# Patient Record
Sex: MALE | Race: WHITE | Employment: FULL TIME | URBAN - METROPOLITAN AREA
[De-identification: names, ages, dates, MRNs, and addresses within clinical notes are randomized per-mention and may not be internally consistent; named-entity substitution may affect disease eponyms.]

---

## 2022-11-08 ENCOUNTER — APPOINTMENT (OUTPATIENT)
Dept: CT IMAGING | Age: 52
DRG: 320 | End: 2022-11-08
Payer: COMMERCIAL

## 2022-11-08 ENCOUNTER — APPOINTMENT (OUTPATIENT)
Dept: GENERAL RADIOLOGY | Age: 52
DRG: 320 | End: 2022-11-08
Payer: COMMERCIAL

## 2022-11-08 ENCOUNTER — HOSPITAL ENCOUNTER (INPATIENT)
Age: 52
LOS: 9 days | Discharge: NURSING, REHAB OR LONG-TERM CARE | DRG: 320 | End: 2022-11-17
Attending: EMERGENCY MEDICINE | Admitting: SURGERY
Payer: COMMERCIAL
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
## 2022-11-08 DIAGNOSIS — N50.1 PELVIC HEMATOMA, MALE: ICD-10-CM

## 2022-11-08 DIAGNOSIS — S32.402A CLOSED DISPLACED FRACTURE OF LEFT ACETABULUM, UNSPECIFIED PORTION OF ACETABULUM, INITIAL ENCOUNTER (HCC): ICD-10-CM

## 2022-11-08 DIAGNOSIS — S32.82XA MULTIPLE CLOSED FRACTURES OF PELVIS, INITIAL ENCOUNTER (HCC): ICD-10-CM

## 2022-11-08 LAB
ABSOLUTE EOS #: <0.03 K/UL (ref 0–0.44)
ABSOLUTE IMMATURE GRANULOCYTE: 0.1 K/UL (ref 0–0.3)
ABSOLUTE LYMPH #: 0.77 K/UL (ref 1.1–3.7)
ABSOLUTE MONO #: 1.39 K/UL (ref 0.1–1.2)
BASOPHILS # BLD: 0 % (ref 0–2)
BASOPHILS ABSOLUTE: 0.05 K/UL (ref 0–0.2)
EOSINOPHILS RELATIVE PERCENT: 0 % (ref 1–4)
HCT VFR BLD CALC: 41.8 % (ref 40.7–50.3)
HEMOGLOBIN: 13.8 G/DL (ref 13–17)
IMMATURE GRANULOCYTES: 1 %
INR BLD: 1
LYMPHOCYTES # BLD: 5 % (ref 24–43)
MCH RBC QN AUTO: 27.4 PG (ref 25.2–33.5)
MCHC RBC AUTO-ENTMCNC: 33 G/DL (ref 28.4–34.8)
MCV RBC AUTO: 83.1 FL (ref 82.6–102.9)
MONOCYTES # BLD: 9 % (ref 3–12)
NRBC AUTOMATED: 0 PER 100 WBC
PARTIAL THROMBOPLASTIN TIME: 21.9 SEC (ref 20.5–30.5)
PDW BLD-RTO: 13.9 % (ref 11.8–14.4)
PLATELET # BLD: 281 K/UL (ref 138–453)
PMV BLD AUTO: 10.2 FL (ref 8.1–13.5)
PRO-BNP: 915 PG/ML
PROTHROMBIN TIME: 10.9 SEC (ref 9.1–12.3)
RBC # BLD: 5.03 M/UL (ref 4.21–5.77)
SEG NEUTROPHILS: 85 % (ref 36–65)
SEGMENTED NEUTROPHILS ABSOLUTE COUNT: 13.83 K/UL (ref 1.5–8.1)
TROPONIN, HIGH SENSITIVITY: 22 NG/L (ref 0–22)
TROPONIN, HIGH SENSITIVITY: 36 NG/L (ref 0–22)
WBC # BLD: 16.1 K/UL (ref 3.5–11.3)

## 2022-11-08 PROCEDURE — 85025 COMPLETE CBC W/AUTO DIFF WBC: CPT

## 2022-11-08 PROCEDURE — 80048 BASIC METABOLIC PNL TOTAL CA: CPT

## 2022-11-08 PROCEDURE — 85730 THROMBOPLASTIN TIME PARTIAL: CPT

## 2022-11-08 PROCEDURE — 76376 3D RENDER W/INTRP POSTPROCES: CPT

## 2022-11-08 PROCEDURE — 85610 PROTHROMBIN TIME: CPT

## 2022-11-08 PROCEDURE — 84484 ASSAY OF TROPONIN QUANT: CPT

## 2022-11-08 PROCEDURE — 82306 VITAMIN D 25 HYDROXY: CPT

## 2022-11-08 PROCEDURE — 83880 ASSAY OF NATRIURETIC PEPTIDE: CPT

## 2022-11-08 PROCEDURE — 99285 EMERGENCY DEPT VISIT HI MDM: CPT

## 2022-11-08 PROCEDURE — 93005 ELECTROCARDIOGRAM TRACING: CPT | Performed by: STUDENT IN AN ORGANIZED HEALTH CARE EDUCATION/TRAINING PROGRAM

## 2022-11-08 PROCEDURE — 72190 X-RAY EXAM OF PELVIS: CPT

## 2022-11-08 PROCEDURE — 72192 CT PELVIS W/O DYE: CPT

## 2022-11-08 PROCEDURE — 2000000000 HC ICU R&B

## 2022-11-08 RX ORDER — LIDOCAINE HYDROCHLORIDE 10 MG/ML
20 INJECTION, SOLUTION INFILTRATION; PERINEURAL ONCE
Status: DISCONTINUED | OUTPATIENT
Start: 2022-11-08 | End: 2022-11-09

## 2022-11-08 ASSESSMENT — ENCOUNTER SYMPTOMS
NAUSEA: 0
ABDOMINAL PAIN: 0
VOMITING: 0
RHINORRHEA: 0
BACK PAIN: 1
CONSTIPATION: 0
SORE THROAT: 0
SHORTNESS OF BREATH: 0
CHEST TIGHTNESS: 0
DIARRHEA: 0
COUGH: 0

## 2022-11-08 ASSESSMENT — PAIN SCALES - GENERAL: PAINLEVEL_OUTOF10: 10

## 2022-11-08 ASSESSMENT — PAIN - FUNCTIONAL ASSESSMENT: PAIN_FUNCTIONAL_ASSESSMENT: 0-10

## 2022-11-09 ENCOUNTER — APPOINTMENT (OUTPATIENT)
Dept: GENERAL RADIOLOGY | Age: 52
DRG: 320 | End: 2022-11-09
Payer: COMMERCIAL

## 2022-11-09 ENCOUNTER — ANESTHESIA (OUTPATIENT)
Dept: OPERATING ROOM | Age: 52
DRG: 320 | End: 2022-11-09
Payer: COMMERCIAL

## 2022-11-09 ENCOUNTER — APPOINTMENT (OUTPATIENT)
Dept: CT IMAGING | Age: 52
DRG: 320 | End: 2022-11-09
Payer: COMMERCIAL

## 2022-11-09 ENCOUNTER — ANESTHESIA EVENT (OUTPATIENT)
Dept: OPERATING ROOM | Age: 52
DRG: 320 | End: 2022-11-09
Payer: COMMERCIAL

## 2022-11-09 PROBLEM — R73.9 HYPERGLYCEMIA: Status: ACTIVE | Noted: 2022-11-09

## 2022-11-09 PROBLEM — I10 HYPERTENSION: Status: ACTIVE | Noted: 2022-11-09

## 2022-11-09 PROBLEM — S32.402A ACETABULUM FRACTURE, LEFT (HCC): Status: ACTIVE | Noted: 2022-11-09

## 2022-11-09 PROBLEM — N50.1 PELVIC HEMATOMA IN MALE: Status: ACTIVE | Noted: 2022-11-09

## 2022-11-09 LAB
ABSOLUTE EOS #: 0 K/UL (ref 0–0.4)
ABSOLUTE EOS #: 0 K/UL (ref 0–0.4)
ABSOLUTE IMMATURE GRANULOCYTE: 0 K/UL (ref 0–0.3)
ABSOLUTE IMMATURE GRANULOCYTE: 0.23 K/UL (ref 0–0.3)
ABSOLUTE LYMPH #: 0.56 K/UL (ref 1–4.8)
ABSOLUTE LYMPH #: 0.9 K/UL (ref 1–4.8)
ABSOLUTE MONO #: 0.9 K/UL (ref 0.1–0.8)
ABSOLUTE MONO #: 1.55 K/UL (ref 0.1–0.8)
ALLEN TEST: ABNORMAL
ALLEN TEST: ABNORMAL
ANION GAP SERPL CALCULATED.3IONS-SCNC: 12 MMOL/L (ref 9–17)
ANION GAP SERPL CALCULATED.3IONS-SCNC: 9 MMOL/L (ref 9–17)
ANION GAP SERPL CALCULATED.3IONS-SCNC: 9 MMOL/L (ref 9–17)
ANION GAP SERPL CALCULATED.3IONS-SCNC: NORMAL MMOL/L (ref 9–17)
BASOPHILS # BLD: 0 % (ref 0–2)
BASOPHILS # BLD: 0 % (ref 0–2)
BASOPHILS ABSOLUTE: 0 K/UL (ref 0–0.2)
BASOPHILS ABSOLUTE: 0 K/UL (ref 0–0.2)
BUN BLDV-MCNC: 12 MG/DL (ref 6–20)
BUN BLDV-MCNC: 15 MG/DL (ref 6–20)
BUN BLDV-MCNC: 8 MG/DL (ref 6–20)
BUN BLDV-MCNC: NORMAL MG/DL (ref 6–20)
CALCIUM IONIZED: 1.1 MMOL/L (ref 1.13–1.33)
CALCIUM IONIZED: 1.13 MMOL/L (ref 1.13–1.33)
CALCIUM IONIZED: 1.24 MMOL/L (ref 1.13–1.33)
CALCIUM SERPL-MCNC: 4.6 MG/DL (ref 8.6–10.4)
CALCIUM SERPL-MCNC: 7.8 MG/DL (ref 8.6–10.4)
CALCIUM SERPL-MCNC: 8.7 MG/DL (ref 8.6–10.4)
CALCIUM SERPL-MCNC: NORMAL MG/DL (ref 8.6–10.4)
CARBOXYHEMOGLOBIN: 1.5 % (ref 0–5)
CARBOXYHEMOGLOBIN: 1.7 % (ref 0–5)
CHLORIDE BLD-SCNC: 101 MMOL/L (ref 98–107)
CHLORIDE BLD-SCNC: 108 MMOL/L (ref 98–107)
CHLORIDE BLD-SCNC: 120 MMOL/L (ref 98–107)
CHLORIDE BLD-SCNC: NORMAL MMOL/L (ref 98–107)
CHLORIDE, WHOLE BLOOD: 110 MMOL/L (ref 98–110)
CO2: 10 MMOL/L (ref 20–31)
CO2: 20 MMOL/L (ref 20–31)
CO2: 22 MMOL/L (ref 20–31)
CO2: NORMAL MMOL/L (ref 20–31)
CREAT SERPL-MCNC: 0.35 MG/DL (ref 0.7–1.2)
CREAT SERPL-MCNC: 0.87 MG/DL (ref 0.7–1.2)
CREAT SERPL-MCNC: 1.17 MG/DL (ref 0.7–1.2)
CREAT SERPL-MCNC: NORMAL MG/DL (ref 0.7–1.2)
EOSINOPHILS RELATIVE PERCENT: 0 % (ref 1–4)
EOSINOPHILS RELATIVE PERCENT: 0 % (ref 1–4)
ESTIMATED AVERAGE GLUCOSE: 200 MG/DL
FIO2: 50
FIO2: 50
GFR SERPL CREATININE-BSD FRML MDRD: >60 ML/MIN/1.73M2
GFR SERPL CREATININE-BSD FRML MDRD: NORMAL ML/MIN/1.73M2
GLUCOSE BLD-MCNC: 136 MG/DL (ref 75–110)
GLUCOSE BLD-MCNC: 159 MG/DL (ref 75–110)
GLUCOSE BLD-MCNC: 169 MG/DL (ref 75–110)
GLUCOSE BLD-MCNC: 176 MG/DL (ref 75–110)
GLUCOSE BLD-MCNC: 188 MG/DL (ref 75–110)
GLUCOSE BLD-MCNC: 194 MG/DL (ref 70–99)
GLUCOSE BLD-MCNC: 196 MG/DL (ref 75–110)
GLUCOSE BLD-MCNC: 197 MG/DL (ref 75–110)
GLUCOSE BLD-MCNC: 204 MG/DL (ref 75–110)
GLUCOSE BLD-MCNC: 206 MG/DL (ref 75–110)
GLUCOSE BLD-MCNC: 214 MG/DL (ref 70–99)
GLUCOSE BLD-MCNC: 256 MG/DL (ref 75–110)
GLUCOSE BLD-MCNC: 302 MG/DL (ref 75–110)
GLUCOSE BLD-MCNC: 320 MG/DL (ref 70–99)
GLUCOSE BLD-MCNC: NORMAL MG/DL (ref 70–99)
HBA1C MFR BLD: 8.6 % (ref 4–6)
HCO3 ARTERIAL: 20.7 MMOL/L (ref 22–27)
HCO3 ARTERIAL: 22.6 MMOL/L (ref 22–27)
HCT VFR BLD CALC: 37.9 % (ref 40.7–50.3)
HCT VFR BLD CALC: 41.5 % (ref 40.7–50.3)
HCT VFR BLD CALC: 43.5 % (ref 40.7–50.3)
HEMOGLOBIN: 12.3 GM/DL (ref 13–17)
HEMOGLOBIN: 13 G/DL (ref 13–17)
HEMOGLOBIN: 14 G/DL (ref 13–17)
IMMATURE GRANULOCYTES: 0 %
IMMATURE GRANULOCYTES: 1 %
LACTIC ACID, WHOLE BLOOD: 1.6 MMOL/L (ref 0.7–2.1)
LV EF: 38 %
LVEF MODALITY: NORMAL
LYMPHOCYTES # BLD: 4 % (ref 24–44)
LYMPHOCYTES # BLD: 4 % (ref 24–44)
MAGNESIUM: 2 MG/DL (ref 1.6–2.6)
MCH RBC QN AUTO: 27.1 PG (ref 25.2–33.5)
MCH RBC QN AUTO: 27.7 PG (ref 25.2–33.5)
MCHC RBC AUTO-ENTMCNC: 31.3 G/DL (ref 28.4–34.8)
MCHC RBC AUTO-ENTMCNC: 32.2 G/DL (ref 28.4–34.8)
MCV RBC AUTO: 84.3 FL (ref 82.6–102.9)
MCV RBC AUTO: 88.3 FL (ref 82.6–102.9)
MONOCYTES # BLD: 11 % (ref 1–7)
MONOCYTES # BLD: 4 % (ref 1–7)
MORPHOLOGY: NORMAL
MORPHOLOGY: NORMAL
NEGATIVE BASE EXCESS, ART: 2 MMOL/L (ref 0–2)
NEGATIVE BASE EXCESS, ART: 3.3 MMOL/L (ref 0–2)
NRBC AUTOMATED: 0 PER 100 WBC
NRBC AUTOMATED: 0 PER 100 WBC
O2 SAT, ARTERIAL: 98.9 % (ref 94–100)
O2 SAT, ARTERIAL: 99 % (ref 94–100)
PATIENT TEMP: 37
PATIENT TEMP: 37
PCO2 ARTERIAL: 35.5 MMHG (ref 32–45)
PCO2 ARTERIAL: 40.6 MMHG (ref 32–45)
PDW BLD-RTO: 13.5 % (ref 11.8–14.4)
PDW BLD-RTO: 13.9 % (ref 11.8–14.4)
PH ARTERIAL: 7.37 (ref 7.35–7.45)
PH ARTERIAL: 7.38 (ref 7.35–7.45)
PHOSPHORUS: 3.7 MG/DL (ref 2.5–4.5)
PLATELET # BLD: 235 K/UL (ref 138–453)
PLATELET # BLD: 334 K/UL (ref 138–453)
PMV BLD AUTO: 10 FL (ref 8.1–13.5)
PMV BLD AUTO: 10 FL (ref 8.1–13.5)
PO2 ARTERIAL: 135 MMHG (ref 75–95)
PO2 ARTERIAL: 143 MMHG (ref 75–95)
POTASSIUM SERPL-SCNC: 3 MMOL/L (ref 3.7–5.3)
POTASSIUM SERPL-SCNC: 4.2 MMOL/L (ref 3.7–5.3)
POTASSIUM SERPL-SCNC: 4.4 MMOL/L (ref 3.7–5.3)
POTASSIUM SERPL-SCNC: NORMAL MMOL/L (ref 3.7–5.3)
POTASSIUM, WHOLE BLOOD: 4.3 MMOL/L (ref 3.6–5)
RBC # BLD: 4.7 M/UL (ref 4.21–5.77)
RBC # BLD: 5.16 M/UL (ref 4.21–5.77)
SEG NEUTROPHILS: 85 % (ref 36–66)
SEG NEUTROPHILS: 91 % (ref 36–66)
SEGMENTED NEUTROPHILS ABSOLUTE COUNT: 11.99 K/UL (ref 1.8–7.7)
SEGMENTED NEUTROPHILS ABSOLUTE COUNT: 20.47 K/UL (ref 1.8–7.7)
SODIUM BLD-SCNC: 132 MMOL/L (ref 135–144)
SODIUM BLD-SCNC: 137 MMOL/L (ref 135–144)
SODIUM BLD-SCNC: 142 MMOL/L (ref 135–144)
SODIUM BLD-SCNC: NORMAL MMOL/L (ref 135–144)
SODIUM, WHOLE BLOOD: 135 MMOL/L (ref 136–145)
TROPONIN, HIGH SENSITIVITY: 42 NG/L (ref 0–22)
VITAMIN D 25-HYDROXY: 9.3 NG/ML
WBC # BLD: 14.1 K/UL (ref 3.5–11.3)
WBC # BLD: 22.5 K/UL (ref 3.5–11.3)

## 2022-11-09 PROCEDURE — 72192 CT PELVIS W/O DYE: CPT

## 2022-11-09 PROCEDURE — 6360000002 HC RX W HCPCS

## 2022-11-09 PROCEDURE — 72190 X-RAY EXAM OF PELVIS: CPT

## 2022-11-09 PROCEDURE — 82805 BLOOD GASES W/O2 SATURATION: CPT

## 2022-11-09 PROCEDURE — 3600000004 HC SURGERY LEVEL 4 BASE: Performed by: ORTHOPAEDIC SURGERY

## 2022-11-09 PROCEDURE — 85025 COMPLETE CBC W/AUTO DIFF WBC: CPT

## 2022-11-09 PROCEDURE — C1713 ANCHOR/SCREW BN/BN,TIS/BN: HCPCS | Performed by: ORTHOPAEDIC SURGERY

## 2022-11-09 PROCEDURE — 6360000002 HC RX W HCPCS: Performed by: NURSE ANESTHETIST, CERTIFIED REGISTERED

## 2022-11-09 PROCEDURE — 86850 RBC ANTIBODY SCREEN: CPT

## 2022-11-09 PROCEDURE — 92523 SPEECH SOUND LANG COMPREHEN: CPT

## 2022-11-09 PROCEDURE — 6370000000 HC RX 637 (ALT 250 FOR IP)

## 2022-11-09 PROCEDURE — 3209999900 FLUORO FOR SURGICAL PROCEDURES

## 2022-11-09 PROCEDURE — 3600000014 HC SURGERY LEVEL 4 ADDTL 15MIN: Performed by: ORTHOPAEDIC SURGERY

## 2022-11-09 PROCEDURE — 6370000000 HC RX 637 (ALT 250 FOR IP): Performed by: STUDENT IN AN ORGANIZED HEALTH CARE EDUCATION/TRAINING PROGRAM

## 2022-11-09 PROCEDURE — 2580000003 HC RX 258

## 2022-11-09 PROCEDURE — 3700000001 HC ADD 15 MINUTES (ANESTHESIA): Performed by: ORTHOPAEDIC SURGERY

## 2022-11-09 PROCEDURE — 76376 3D RENDER W/INTRP POSTPROCES: CPT

## 2022-11-09 PROCEDURE — 82962 GLUCOSE BLOOD TEST: CPT

## 2022-11-09 PROCEDURE — 71045 X-RAY EXAM CHEST 1 VIEW: CPT

## 2022-11-09 PROCEDURE — 93005 ELECTROCARDIOGRAM TRACING: CPT | Performed by: STUDENT IN AN ORGANIZED HEALTH CARE EDUCATION/TRAINING PROGRAM

## 2022-11-09 PROCEDURE — 72170 X-RAY EXAM OF PELVIS: CPT

## 2022-11-09 PROCEDURE — 6360000002 HC RX W HCPCS: Performed by: STUDENT IN AN ORGANIZED HEALTH CARE EDUCATION/TRAINING PROGRAM

## 2022-11-09 PROCEDURE — 7100000001 HC PACU RECOVERY - ADDTL 15 MIN: Performed by: ORTHOPAEDIC SURGERY

## 2022-11-09 PROCEDURE — 82947 ASSAY GLUCOSE BLOOD QUANT: CPT

## 2022-11-09 PROCEDURE — 93308 TTE F-UP OR LMTD: CPT

## 2022-11-09 PROCEDURE — 36415 COLL VENOUS BLD VENIPUNCTURE: CPT

## 2022-11-09 PROCEDURE — 73560 X-RAY EXAM OF KNEE 1 OR 2: CPT

## 2022-11-09 PROCEDURE — 82330 ASSAY OF CALCIUM: CPT

## 2022-11-09 PROCEDURE — 85014 HEMATOCRIT: CPT

## 2022-11-09 PROCEDURE — 84484 ASSAY OF TROPONIN QUANT: CPT

## 2022-11-09 PROCEDURE — 84132 ASSAY OF SERUM POTASSIUM: CPT

## 2022-11-09 PROCEDURE — 2500000003 HC RX 250 WO HCPCS: Performed by: STUDENT IN AN ORGANIZED HEALTH CARE EDUCATION/TRAINING PROGRAM

## 2022-11-09 PROCEDURE — 37799 UNLISTED PX VASCULAR SURGERY: CPT

## 2022-11-09 PROCEDURE — 0QS504Z REPOSITION LEFT ACETABULUM WITH INTERNAL FIXATION DEVICE, OPEN APPROACH: ICD-10-PCS | Performed by: ORTHOPAEDIC SURGERY

## 2022-11-09 PROCEDURE — 0QS304Z REPOSITION LEFT PELVIC BONE WITH INTERNAL FIXATION DEVICE, OPEN APPROACH: ICD-10-PCS | Performed by: ORTHOPAEDIC SURGERY

## 2022-11-09 PROCEDURE — 83735 ASSAY OF MAGNESIUM: CPT

## 2022-11-09 PROCEDURE — 73630 X-RAY EXAM OF FOOT: CPT

## 2022-11-09 PROCEDURE — 2500000003 HC RX 250 WO HCPCS

## 2022-11-09 PROCEDURE — 83605 ASSAY OF LACTIC ACID: CPT

## 2022-11-09 PROCEDURE — 2580000003 HC RX 258: Performed by: STUDENT IN AN ORGANIZED HEALTH CARE EDUCATION/TRAINING PROGRAM

## 2022-11-09 PROCEDURE — 86920 COMPATIBILITY TEST SPIN: CPT

## 2022-11-09 PROCEDURE — 83036 HEMOGLOBIN GLYCOSYLATED A1C: CPT

## 2022-11-09 PROCEDURE — 2720000010 HC SURG SUPPLY STERILE: Performed by: ORTHOPAEDIC SURGERY

## 2022-11-09 PROCEDURE — 2580000003 HC RX 258: Performed by: NURSE ANESTHETIST, CERTIFIED REGISTERED

## 2022-11-09 PROCEDURE — 86900 BLOOD TYPING SEROLOGIC ABO: CPT

## 2022-11-09 PROCEDURE — 85018 HEMOGLOBIN: CPT

## 2022-11-09 PROCEDURE — 2000000000 HC ICU R&B

## 2022-11-09 PROCEDURE — 86901 BLOOD TYPING SEROLOGIC RH(D): CPT

## 2022-11-09 PROCEDURE — 2709999900 HC NON-CHARGEABLE SUPPLY: Performed by: ORTHOPAEDIC SURGERY

## 2022-11-09 PROCEDURE — 80048 BASIC METABOLIC PNL TOTAL CA: CPT

## 2022-11-09 PROCEDURE — 84100 ASSAY OF PHOSPHORUS: CPT

## 2022-11-09 PROCEDURE — 6360000002 HC RX W HCPCS: Performed by: ORTHOPAEDIC SURGERY

## 2022-11-09 PROCEDURE — 7100000000 HC PACU RECOVERY - FIRST 15 MIN: Performed by: ORTHOPAEDIC SURGERY

## 2022-11-09 PROCEDURE — 3700000000 HC ANESTHESIA ATTENDED CARE: Performed by: ORTHOPAEDIC SURGERY

## 2022-11-09 PROCEDURE — 2500000003 HC RX 250 WO HCPCS: Performed by: NURSE ANESTHETIST, CERTIFIED REGISTERED

## 2022-11-09 PROCEDURE — 2580000003 HC RX 258: Performed by: ORTHOPAEDIC SURGERY

## 2022-11-09 DEVICE — CORTEX SCREW S.T.: Type: IMPLANTABLE DEVICE | Site: ACETABULUM | Status: FUNCTIONAL

## 2022-11-09 DEVICE — SUPRAPECTINEAL PLATE, LEFT
Type: IMPLANTABLE DEVICE | Status: FUNCTIONAL
Brand: PRO

## 2022-11-09 DEVICE — K-WIRE
Type: IMPLANTABLE DEVICE | Status: FUNCTIONAL
Brand: PRO

## 2022-11-09 RX ORDER — ERGOCALCIFEROL 1.25 MG/1
50000 CAPSULE ORAL WEEKLY
Qty: 8 CAPSULE | Refills: 1 | Status: SHIPPED | OUTPATIENT
Start: 2022-11-09

## 2022-11-09 RX ORDER — LABETALOL HYDROCHLORIDE 5 MG/ML
10 INJECTION, SOLUTION INTRAVENOUS EVERY 6 HOURS PRN
Status: DISCONTINUED | OUTPATIENT
Start: 2022-11-09 | End: 2022-11-17 | Stop reason: HOSPADM

## 2022-11-09 RX ORDER — FENTANYL CITRATE 50 UG/ML
INJECTION, SOLUTION INTRAMUSCULAR; INTRAVENOUS PRN
Status: DISCONTINUED | OUTPATIENT
Start: 2022-11-09 | End: 2022-11-09 | Stop reason: SDUPTHER

## 2022-11-09 RX ORDER — POLYETHYLENE GLYCOL 3350 17 G/17G
17 POWDER, FOR SOLUTION ORAL DAILY
Status: DISCONTINUED | OUTPATIENT
Start: 2022-11-09 | End: 2022-11-17 | Stop reason: HOSPADM

## 2022-11-09 RX ORDER — SODIUM CHLORIDE 9 MG/ML
INJECTION, SOLUTION INTRAVENOUS CONTINUOUS PRN
Status: DISCONTINUED | OUTPATIENT
Start: 2022-11-09 | End: 2022-11-09 | Stop reason: SDUPTHER

## 2022-11-09 RX ORDER — CEFAZOLIN SODIUM 1 G/3ML
INJECTION, POWDER, FOR SOLUTION INTRAMUSCULAR; INTRAVENOUS PRN
Status: DISCONTINUED | OUTPATIENT
Start: 2022-11-09 | End: 2022-11-09 | Stop reason: SDUPTHER

## 2022-11-09 RX ORDER — MAGNESIUM HYDROXIDE 1200 MG/15ML
LIQUID ORAL CONTINUOUS PRN
Status: DISCONTINUED | OUTPATIENT
Start: 2022-11-09 | End: 2022-11-09 | Stop reason: HOSPADM

## 2022-11-09 RX ORDER — PROPOFOL 10 MG/ML
INJECTION, EMULSION INTRAVENOUS PRN
Status: DISCONTINUED | OUTPATIENT
Start: 2022-11-09 | End: 2022-11-09 | Stop reason: SDUPTHER

## 2022-11-09 RX ORDER — AMLODIPINE BESYLATE 10 MG/1
10 TABLET ORAL DAILY
Status: DISCONTINUED | OUTPATIENT
Start: 2022-11-09 | End: 2022-11-17 | Stop reason: HOSPADM

## 2022-11-09 RX ORDER — KETAMINE HCL IN NACL, ISO-OSM 100MG/10ML
SYRINGE (ML) INJECTION PRN
Status: DISCONTINUED | OUTPATIENT
Start: 2022-11-09 | End: 2022-11-09 | Stop reason: SDUPTHER

## 2022-11-09 RX ORDER — NEOSTIGMINE METHYLSULFATE 5 MG/5 ML
SYRINGE (ML) INTRAVENOUS PRN
Status: DISCONTINUED | OUTPATIENT
Start: 2022-11-09 | End: 2022-11-09 | Stop reason: SDUPTHER

## 2022-11-09 RX ORDER — MIDAZOLAM HYDROCHLORIDE 1 MG/ML
INJECTION INTRAMUSCULAR; INTRAVENOUS PRN
Status: DISCONTINUED | OUTPATIENT
Start: 2022-11-09 | End: 2022-11-09 | Stop reason: SDUPTHER

## 2022-11-09 RX ORDER — CARVEDILOL 25 MG/1
25 TABLET ORAL 2 TIMES DAILY WITH MEALS
Status: DISCONTINUED | OUTPATIENT
Start: 2022-11-09 | End: 2022-11-13

## 2022-11-09 RX ORDER — TRANEXAMIC ACID 10 MG/ML
INJECTION, SOLUTION INTRAVENOUS PRN
Status: DISCONTINUED | OUTPATIENT
Start: 2022-11-09 | End: 2022-11-09 | Stop reason: SDUPTHER

## 2022-11-09 RX ORDER — ATORVASTATIN CALCIUM 40 MG/1
40 TABLET, FILM COATED ORAL DAILY
COMMUNITY

## 2022-11-09 RX ORDER — MAGNESIUM SULFATE HEPTAHYDRATE 40 MG/ML
INJECTION, SOLUTION INTRAVENOUS PRN
Status: DISCONTINUED | OUTPATIENT
Start: 2022-11-09 | End: 2022-11-09 | Stop reason: SDUPTHER

## 2022-11-09 RX ORDER — ENOXAPARIN SODIUM 100 MG/ML
30 INJECTION SUBCUTANEOUS 2 TIMES DAILY
Status: DISCONTINUED | OUTPATIENT
Start: 2022-11-09 | End: 2022-11-17 | Stop reason: HOSPADM

## 2022-11-09 RX ORDER — SODIUM CHLORIDE 0.9 % (FLUSH) 0.9 %
5-40 SYRINGE (ML) INJECTION EVERY 12 HOURS SCHEDULED
Status: DISCONTINUED | OUTPATIENT
Start: 2022-11-09 | End: 2022-11-09 | Stop reason: HOSPADM

## 2022-11-09 RX ORDER — SENNA PLUS 8.6 MG/1
1 TABLET ORAL DAILY PRN
Status: DISCONTINUED | OUTPATIENT
Start: 2022-11-09 | End: 2022-11-17 | Stop reason: HOSPADM

## 2022-11-09 RX ORDER — HYDRALAZINE HYDROCHLORIDE 20 MG/ML
10 INJECTION INTRAMUSCULAR; INTRAVENOUS
Status: DISCONTINUED | OUTPATIENT
Start: 2022-11-09 | End: 2022-11-09 | Stop reason: HOSPADM

## 2022-11-09 RX ORDER — FUROSEMIDE 20 MG/1
20 TABLET ORAL DAILY
COMMUNITY

## 2022-11-09 RX ORDER — ROCURONIUM BROMIDE 10 MG/ML
INJECTION, SOLUTION INTRAVENOUS PRN
Status: DISCONTINUED | OUTPATIENT
Start: 2022-11-09 | End: 2022-11-09 | Stop reason: SDUPTHER

## 2022-11-09 RX ORDER — ONDANSETRON 4 MG/1
4 TABLET, ORALLY DISINTEGRATING ORAL EVERY 8 HOURS PRN
Status: DISCONTINUED | OUTPATIENT
Start: 2022-11-09 | End: 2022-11-17 | Stop reason: HOSPADM

## 2022-11-09 RX ORDER — DEXTROSE AND SODIUM CHLORIDE 5; .45 G/100ML; G/100ML
INJECTION, SOLUTION INTRAVENOUS CONTINUOUS
Status: DISCONTINUED | OUTPATIENT
Start: 2022-11-09 | End: 2022-11-10

## 2022-11-09 RX ORDER — PHENYLEPHRINE HYDROCHLORIDE 10 MG/ML
INJECTION INTRAVENOUS PRN
Status: DISCONTINUED | OUTPATIENT
Start: 2022-11-09 | End: 2022-11-09 | Stop reason: SDUPTHER

## 2022-11-09 RX ORDER — SODIUM CHLORIDE 0.9 % (FLUSH) 0.9 %
10 SYRINGE (ML) INJECTION PRN
Status: DISCONTINUED | OUTPATIENT
Start: 2022-11-09 | End: 2022-11-17 | Stop reason: HOSPADM

## 2022-11-09 RX ORDER — AMLODIPINE BESYLATE 10 MG/1
10 TABLET ORAL DAILY
COMMUNITY

## 2022-11-09 RX ORDER — METHOCARBAMOL 750 MG/1
750 TABLET, FILM COATED ORAL 4 TIMES DAILY
Status: DISCONTINUED | OUTPATIENT
Start: 2022-11-09 | End: 2022-11-10

## 2022-11-09 RX ORDER — SODIUM CHLORIDE 9 MG/ML
INJECTION, SOLUTION INTRAVENOUS PRN
Status: DISCONTINUED | OUTPATIENT
Start: 2022-11-09 | End: 2022-11-09 | Stop reason: HOSPADM

## 2022-11-09 RX ORDER — POTASSIUM CHLORIDE 7.45 MG/ML
10 INJECTION INTRAVENOUS
Status: COMPLETED | OUTPATIENT
Start: 2022-11-09 | End: 2022-11-09

## 2022-11-09 RX ORDER — ONDANSETRON 2 MG/ML
4 INJECTION INTRAMUSCULAR; INTRAVENOUS EVERY 6 HOURS PRN
Status: DISCONTINUED | OUTPATIENT
Start: 2022-11-09 | End: 2022-11-17 | Stop reason: HOSPADM

## 2022-11-09 RX ORDER — DEXTROSE MONOHYDRATE 100 MG/ML
INJECTION, SOLUTION INTRAVENOUS CONTINUOUS PRN
Status: DISCONTINUED | OUTPATIENT
Start: 2022-11-09 | End: 2022-11-17 | Stop reason: HOSPADM

## 2022-11-09 RX ORDER — FENTANYL CITRATE 50 UG/ML
50 INJECTION, SOLUTION INTRAMUSCULAR; INTRAVENOUS
Status: DISCONTINUED | OUTPATIENT
Start: 2022-11-09 | End: 2022-11-10

## 2022-11-09 RX ORDER — SODIUM CHLORIDE 0.9 % (FLUSH) 0.9 %
5-40 SYRINGE (ML) INJECTION PRN
Status: DISCONTINUED | OUTPATIENT
Start: 2022-11-09 | End: 2022-11-09 | Stop reason: HOSPADM

## 2022-11-09 RX ORDER — ONDANSETRON 2 MG/ML
4 INJECTION INTRAMUSCULAR; INTRAVENOUS
Status: DISCONTINUED | OUTPATIENT
Start: 2022-11-09 | End: 2022-11-09 | Stop reason: HOSPADM

## 2022-11-09 RX ORDER — LABETALOL HYDROCHLORIDE 5 MG/ML
10 INJECTION, SOLUTION INTRAVENOUS
Status: DISCONTINUED | OUTPATIENT
Start: 2022-11-09 | End: 2022-11-09 | Stop reason: HOSPADM

## 2022-11-09 RX ORDER — FAMOTIDINE 20 MG/1
20 TABLET, FILM COATED ORAL 2 TIMES DAILY
Status: DISCONTINUED | OUTPATIENT
Start: 2022-11-09 | End: 2022-11-17 | Stop reason: HOSPADM

## 2022-11-09 RX ORDER — ACETAMINOPHEN 500 MG
1000 TABLET ORAL EVERY 8 HOURS SCHEDULED
Status: DISCONTINUED | OUTPATIENT
Start: 2022-11-09 | End: 2022-11-17 | Stop reason: HOSPADM

## 2022-11-09 RX ORDER — ATORVASTATIN CALCIUM 40 MG/1
40 TABLET, FILM COATED ORAL DAILY
Status: DISCONTINUED | OUTPATIENT
Start: 2022-11-09 | End: 2022-11-17 | Stop reason: HOSPADM

## 2022-11-09 RX ORDER — LISINOPRIL 20 MG/1
20 TABLET ORAL DAILY
COMMUNITY

## 2022-11-09 RX ORDER — HYDRALAZINE HYDROCHLORIDE 20 MG/ML
10 INJECTION INTRAMUSCULAR; INTRAVENOUS EVERY 6 HOURS PRN
Status: DISCONTINUED | OUTPATIENT
Start: 2022-11-09 | End: 2022-11-17 | Stop reason: HOSPADM

## 2022-11-09 RX ORDER — CARVEDILOL 25 MG/1
25 TABLET ORAL 2 TIMES DAILY WITH MEALS
COMMUNITY

## 2022-11-09 RX ORDER — SODIUM CHLORIDE 9 MG/ML
INJECTION, SOLUTION INTRAVENOUS PRN
Status: DISCONTINUED | OUTPATIENT
Start: 2022-11-09 | End: 2022-11-17 | Stop reason: HOSPADM

## 2022-11-09 RX ORDER — VANCOMYCIN HYDROCHLORIDE 1 G/20ML
INJECTION, POWDER, LYOPHILIZED, FOR SOLUTION INTRAVENOUS PRN
Status: DISCONTINUED | OUTPATIENT
Start: 2022-11-09 | End: 2022-11-09 | Stop reason: HOSPADM

## 2022-11-09 RX ORDER — CARVEDILOL 25 MG/1
25 TABLET ORAL 2 TIMES DAILY WITH MEALS
Status: DISCONTINUED | OUTPATIENT
Start: 2022-11-09 | End: 2022-11-09

## 2022-11-09 RX ORDER — LIDOCAINE HYDROCHLORIDE 10 MG/ML
INJECTION, SOLUTION EPIDURAL; INFILTRATION; INTRACAUDAL; PERINEURAL PRN
Status: DISCONTINUED | OUTPATIENT
Start: 2022-11-09 | End: 2022-11-09 | Stop reason: SDUPTHER

## 2022-11-09 RX ORDER — SPIRONOLACTONE 25 MG/1
25 TABLET ORAL DAILY
COMMUNITY

## 2022-11-09 RX ORDER — OXYCODONE HYDROCHLORIDE 5 MG/1
5 TABLET ORAL EVERY 4 HOURS PRN
Status: DISCONTINUED | OUTPATIENT
Start: 2022-11-09 | End: 2022-11-17

## 2022-11-09 RX ORDER — CALCIUM CHLORIDE 100 MG/ML
INJECTION INTRAVENOUS; INTRAVENTRICULAR PRN
Status: DISCONTINUED | OUTPATIENT
Start: 2022-11-09 | End: 2022-11-09 | Stop reason: SDUPTHER

## 2022-11-09 RX ORDER — EPHEDRINE SULFATE/0.9% NACL/PF 50 MG/5 ML
SYRINGE (ML) INTRAVENOUS PRN
Status: DISCONTINUED | OUTPATIENT
Start: 2022-11-09 | End: 2022-11-09 | Stop reason: SDUPTHER

## 2022-11-09 RX ORDER — GLYCOPYRROLATE 1 MG/5 ML
SYRINGE (ML) INTRAVENOUS PRN
Status: DISCONTINUED | OUTPATIENT
Start: 2022-11-09 | End: 2022-11-09 | Stop reason: SDUPTHER

## 2022-11-09 RX ORDER — SODIUM CHLORIDE 0.9 % (FLUSH) 0.9 %
10 SYRINGE (ML) INJECTION EVERY 12 HOURS SCHEDULED
Status: DISCONTINUED | OUTPATIENT
Start: 2022-11-09 | End: 2022-11-17 | Stop reason: HOSPADM

## 2022-11-09 RX ORDER — DEXAMETHASONE SODIUM PHOSPHATE 10 MG/ML
INJECTION INTRAMUSCULAR; INTRAVENOUS PRN
Status: DISCONTINUED | OUTPATIENT
Start: 2022-11-09 | End: 2022-11-09 | Stop reason: SDUPTHER

## 2022-11-09 RX ADMIN — Medication 10 MG: at 03:20

## 2022-11-09 RX ADMIN — Medication 25 MG: at 14:11

## 2022-11-09 RX ADMIN — FENTANYL CITRATE 50 MCG: 0.05 INJECTION, SOLUTION INTRAMUSCULAR; INTRAVENOUS at 18:52

## 2022-11-09 RX ADMIN — OXYCODONE 5 MG: 5 TABLET ORAL at 11:31

## 2022-11-09 RX ADMIN — METHOCARBAMOL TABLETS 750 MG: 750 TABLET, COATED ORAL at 08:49

## 2022-11-09 RX ADMIN — PHENYLEPHRINE HYDROCHLORIDE 100 MCG: 10 INJECTION INTRAVENOUS at 16:10

## 2022-11-09 RX ADMIN — Medication 25 MG: at 15:45

## 2022-11-09 RX ADMIN — DEXAMETHASONE SODIUM PHOSPHATE 10 MG: 10 INJECTION INTRAMUSCULAR; INTRAVENOUS at 13:37

## 2022-11-09 RX ADMIN — FENTANYL CITRATE 50 MCG: 0.05 INJECTION, SOLUTION INTRAMUSCULAR; INTRAVENOUS at 19:07

## 2022-11-09 RX ADMIN — MAGNESIUM SULFATE HEPTAHYDRATE 2000 MG: 40 INJECTION, SOLUTION INTRAVENOUS at 14:31

## 2022-11-09 RX ADMIN — PROPOFOL 150 MG: 10 INJECTION, EMULSION INTRAVENOUS at 13:30

## 2022-11-09 RX ADMIN — SODIUM CHLORIDE, PRESERVATIVE FREE 10 ML: 5 INJECTION INTRAVENOUS at 08:47

## 2022-11-09 RX ADMIN — CALCIUM CHLORIDE 0.5 G: 100 INJECTION, SOLUTION INTRAVENOUS; INTRAVENTRICULAR at 16:36

## 2022-11-09 RX ADMIN — ROCURONIUM BROMIDE 20 MG: 10 INJECTION, SOLUTION INTRAVENOUS at 14:20

## 2022-11-09 RX ADMIN — ROCURONIUM BROMIDE 10 MG: 10 INJECTION, SOLUTION INTRAVENOUS at 16:04

## 2022-11-09 RX ADMIN — CEFAZOLIN 2 G: 1 INJECTION, POWDER, FOR SOLUTION INTRAMUSCULAR; INTRAVENOUS at 17:39

## 2022-11-09 RX ADMIN — Medication 4 MG: at 19:05

## 2022-11-09 RX ADMIN — POTASSIUM CHLORIDE 10 MEQ: 7.46 INJECTION, SOLUTION INTRAVENOUS at 04:03

## 2022-11-09 RX ADMIN — DEXTROSE AND SODIUM CHLORIDE: 5; 450 INJECTION, SOLUTION INTRAVENOUS at 09:43

## 2022-11-09 RX ADMIN — PHENYLEPHRINE HYDROCHLORIDE 100 MCG: 10 INJECTION INTRAVENOUS at 14:55

## 2022-11-09 RX ADMIN — SODIUM CHLORIDE: 9 INJECTION, SOLUTION INTRAVENOUS at 15:54

## 2022-11-09 RX ADMIN — TRANEXAMIC ACID 1 G: 10 INJECTION, SOLUTION INTRAVENOUS at 19:08

## 2022-11-09 RX ADMIN — PHENYLEPHRINE HYDROCHLORIDE 100 MCG: 10 INJECTION INTRAVENOUS at 14:53

## 2022-11-09 RX ADMIN — Medication 2000 MG: at 13:39

## 2022-11-09 RX ADMIN — DEXTROSE AND SODIUM CHLORIDE: 5; 450 INJECTION, SOLUTION INTRAVENOUS at 01:36

## 2022-11-09 RX ADMIN — SODIUM CHLORIDE 7.26 UNITS/HR: 9 INJECTION, SOLUTION INTRAVENOUS at 08:44

## 2022-11-09 RX ADMIN — Medication 0.8 MG: at 19:05

## 2022-11-09 RX ADMIN — LIDOCAINE HYDROCHLORIDE 50 MG: 10 INJECTION, SOLUTION EPIDURAL; INFILTRATION; INTRACAUDAL; PERINEURAL at 13:30

## 2022-11-09 RX ADMIN — PHENYLEPHRINE HYDROCHLORIDE 200 MCG: 10 INJECTION INTRAVENOUS at 15:54

## 2022-11-09 RX ADMIN — ROCURONIUM BROMIDE 10 MG: 10 INJECTION, SOLUTION INTRAVENOUS at 16:40

## 2022-11-09 RX ADMIN — POLYETHYLENE GLYCOL 3350 17 G: 17 POWDER, FOR SOLUTION ORAL at 09:03

## 2022-11-09 RX ADMIN — Medication 10 MG: at 16:18

## 2022-11-09 RX ADMIN — MIDAZOLAM 2 MG: 1 INJECTION INTRAMUSCULAR; INTRAVENOUS at 15:06

## 2022-11-09 RX ADMIN — HYDRALAZINE HYDROCHLORIDE 10 MG: 20 INJECTION INTRAMUSCULAR; INTRAVENOUS at 21:27

## 2022-11-09 RX ADMIN — ENOXAPARIN SODIUM 30 MG: 100 INJECTION SUBCUTANEOUS at 08:48

## 2022-11-09 RX ADMIN — SODIUM CHLORIDE: 9 INJECTION, SOLUTION INTRAVENOUS at 15:33

## 2022-11-09 RX ADMIN — FENTANYL CITRATE 50 MCG: 0.05 INJECTION, SOLUTION INTRAMUSCULAR; INTRAVENOUS at 13:30

## 2022-11-09 RX ADMIN — SODIUM CHLORIDE: 9 INJECTION, SOLUTION INTRAVENOUS at 14:05

## 2022-11-09 RX ADMIN — FAMOTIDINE 20 MG: 20 TABLET, FILM COATED ORAL at 08:47

## 2022-11-09 RX ADMIN — POTASSIUM CHLORIDE 10 MEQ: 7.46 INJECTION, SOLUTION INTRAVENOUS at 06:33

## 2022-11-09 RX ADMIN — ROCURONIUM BROMIDE 20 MG: 10 INJECTION, SOLUTION INTRAVENOUS at 15:06

## 2022-11-09 RX ADMIN — Medication 10 MG: at 16:05

## 2022-11-09 RX ADMIN — SODIUM CHLORIDE: 9 INJECTION, SOLUTION INTRAVENOUS at 13:24

## 2022-11-09 RX ADMIN — POTASSIUM CHLORIDE 10 MEQ: 7.46 INJECTION, SOLUTION INTRAVENOUS at 02:42

## 2022-11-09 RX ADMIN — PHENYLEPHRINE HYDROCHLORIDE 100 MCG: 10 INJECTION INTRAVENOUS at 15:50

## 2022-11-09 RX ADMIN — SODIUM CHLORIDE 4.3 UNITS/HR: 9 INJECTION, SOLUTION INTRAVENOUS at 23:52

## 2022-11-09 RX ADMIN — HYDRALAZINE HYDROCHLORIDE 10 MG: 20 INJECTION INTRAMUSCULAR; INTRAVENOUS at 02:19

## 2022-11-09 RX ADMIN — FENTANYL CITRATE 50 MCG: 50 INJECTION INTRAMUSCULAR; INTRAVENOUS at 21:34

## 2022-11-09 RX ADMIN — TRANEXAMIC ACID 1 G: 10 INJECTION, SOLUTION INTRAVENOUS at 14:25

## 2022-11-09 RX ADMIN — ROCURONIUM BROMIDE 50 MG: 10 INJECTION, SOLUTION INTRAVENOUS at 13:30

## 2022-11-09 RX ADMIN — POTASSIUM CHLORIDE 10 MEQ: 7.46 INJECTION, SOLUTION INTRAVENOUS at 05:08

## 2022-11-09 RX ADMIN — Medication 10 MG: at 16:10

## 2022-11-09 RX ADMIN — PHENYLEPHRINE HYDROCHLORIDE 30 MCG/MIN: 10 INJECTION INTRAVENOUS at 16:13

## 2022-11-09 RX ADMIN — PHENYLEPHRINE HYDROCHLORIDE 100 MCG: 10 INJECTION INTRAVENOUS at 15:37

## 2022-11-09 RX ADMIN — Medication 10 MG: at 23:26

## 2022-11-09 RX ADMIN — PHENYLEPHRINE HYDROCHLORIDE 100 MCG: 10 INJECTION INTRAVENOUS at 16:03

## 2022-11-09 RX ADMIN — PHENYLEPHRINE HYDROCHLORIDE 200 MCG: 10 INJECTION INTRAVENOUS at 15:06

## 2022-11-09 RX ADMIN — ROCURONIUM BROMIDE 10 MG: 10 INJECTION, SOLUTION INTRAVENOUS at 15:32

## 2022-11-09 ASSESSMENT — PAIN SCALES - GENERAL
PAINLEVEL_OUTOF10: 4
PAINLEVEL_OUTOF10: 7
PAINLEVEL_OUTOF10: 0
PAINLEVEL_OUTOF10: 5
PAINLEVEL_OUTOF10: 0
PAINLEVEL_OUTOF10: 0
PAINLEVEL_OUTOF10: 7
PAINLEVEL_OUTOF10: 0
PAINLEVEL_OUTOF10: 0

## 2022-11-09 ASSESSMENT — PAIN DESCRIPTION - LOCATION
LOCATION: ABDOMEN
LOCATION: BACK;HIP;KNEE
LOCATION: BACK;HIP;KNEE

## 2022-11-09 ASSESSMENT — PAIN - FUNCTIONAL ASSESSMENT: PAIN_FUNCTIONAL_ASSESSMENT: 0-10

## 2022-11-09 ASSESSMENT — PAIN DESCRIPTION - ORIENTATION
ORIENTATION: LEFT
ORIENTATION: LEFT

## 2022-11-09 ASSESSMENT — PAIN DESCRIPTION - DESCRIPTORS
DESCRIPTORS: ACHING
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
## 2022-11-09 NOTE — ACP (ADVANCE CARE PLANNING)
Advance Care Planning     Advance Care Planning Inpatient Note  Spiritual Care Department    Today's Date: 11/9/2022  Unit: [facility] CAR 1- SICU    Received request from patient. Upon review of chart and communication with care team, patient's decision making abilities are not in question. . Patient was/were present in the room during visit. Goals of ACP Conversation:  Complete [facility]    Health Care Decision Makers:       Primary Decision Maker: Soledad Garcia - Brother/Sister - [phone]  Summary:  Completed New Documents    Advance Care Planning Documents (Patient Wishes):  Healthcare Power of /Advance Directive Appointment of Health Care Agent     Assessment:  Patient was very sleepy throughout visit. He is scheduled to have surgery this morning. He was able to state that he wants his brother to be his [facility] and that he had already spoken to his brother about it.  did not discuss living will with patient because he was having a hard time staying awake. Interventions:  Assisted in the completion of documents according to patient's wishes at this time    Care Preferences Communicated:   No    Outcomes/Plan:  New advance directive completed. Returned original document(s) to patient, as well as copies for distribution to appointed agents  Copy of advance directive given to staff to scan into medical record.     Electronically signed by [facility] on 11/9/2022 at 9:36 AM
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
## 2022-11-09 NOTE — CARE COORDINATION
SBIRT-  Met with pt this a.m was very sleepy, however answered questions appropriately. 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 Pt states he does not drink alcohol that often, maybe once in a while will have a beer. Prior rehab while in long term at [facility]. Pt states he was released in 2016 and has had no recent intervention. Pt states he has h/o depression and meds are managed by Dr Vamsi Phelan in [facility] [place]. Pt also states his wife is presently in [address] [place] [address]. Pt unsure of rehab at this time, more concerned about his medical condition right now. List of drug tx resources provided and placed on bedside table. Alcohol Screening and Brief Intervention        No results for input(s): ALC in the last 72 hours. Alcohol Pre-screening  (MEN ONLY) How many times in the past year have you had 5 or more drinks in a day?: None       Alcohol Screening Audit       Drug Pre-Screening   How many times in the past year have you used a recreational drug or used a prescription medication for nonmedical reasons?: 1 or more    Drug Screening DAST  TOTAL SCORE[de-identified] 4    Mood Pre-Screening (PHQ-2)  During the past two weeks, have you been bothered by little interest or pleasure in doing things?: No  During the past two weeks, have you been bothered by feeling down, depressed, or hopeless?: No    Mood Pre-Screening (PHQ-9)         I have interviewed Alvina Rhodes, [id number] regarding  His alcohol consumption/drug use and risk for excessive use. Screenings were positive. Patient  Declined intervention at this time.      Deferred []    Completed on: 11/9/2022   [address] [facility]
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
## 2022-11-09 NOTE — PROGRESS NOTES
[address] [facility] [address]     Emergency/Trauma Note    PATIENT NAME: Triston Hale    Shift date: 11.8.2022  Shift day: Tuesday   Shift # 2    Room # 1008/1008-01   Name: Triston Hale            Age: 45 y.o. Gender: male          Amish: None   Place of Protestant: unknown    Trauma/Incident type: Adult Trauma Consult  Admit Date & Time: 11/8/2022  9:12 PM  TRAUMA NAME: None    ADVANCE DIRECTIVES IN CHART? No    NAME OF DECISION MAKER: None    RELATIONSHIP OF DECISION MAKER TO PATIENT: NOne    PATIENT/EVENT DESCRIPTION:  Triston Hale is a 45 y.o. male who arrived as a TRAUMA CONSULT. Pt to be admitted to [facility]. SPIRITUAL ASSESSMENT-INTERVENTION-OUTCOME:  Patient appears to be sleeping and will not respond to .  spoke to the nurse and was informed that patient is a transfer.  was able to locate emergency contact information and updated the system. PATIENT BELONGINGS:  No belongings noted    ANY BELONGINGS OF SIGNIFICANT VALUE NOTED:  None    REGISTRATION STAFF NOTIFIED? Yes      WHAT IS YOUR SPIRITUAL CARE PLAN FOR THIS PATIENT?:  Chaplains will remain available to offer spiritual and emotional support as needed. Electronically signed by Cassie Lopez on 11/9/2022 at 2:35 AM.  [facility]  [phone]       11/08/22 2300   Encounter Summary   Service Provided For: Patient   Referral/Consult From: Multi-disciplinary team   Support System Unknown   Last Encounter  11/08/22   Complexity of Encounter Low   Begin Time 2300   End Time  2315   Total Time Calculated 15 min   Encounter    Type Initial Screen/Assessment   Crisis   Type Trauma  (Consult)   Assessment/Intervention/Outcome   Assessment Unable to assess   Intervention Sustaining Presence/Ministry of presence; Explored Coping Skills/Resources   Outcome Did not respond     Electronically signed by Rosette Gross on 11/9/2022 at 2:36 AM
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
## 2022-11-09 NOTE — PROGRESS NOTES
Physical Therapy        Physical Therapy Cancel Note      DATE: 2022    NAME: Gwynn Rubinstein  MRN: [id number]   : 1970      Patient not seen this date for Physical Therapy due to: Other: L distal femur in traction, possibly going to OR later today. Will check back post-op.        Electronically signed by Charisse Romo PT on 2022 at 11:39 AM
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
## 2022-11-09 NOTE — ED NOTES
The following labs were labeled with appropriate pt sticker and tubed to lab:     [x] Blue     [x] Lavender   [] on ice  [x] Green/yellow  [] Green/black [] on ice  [] Amanda Telly  [] on ice  [] Yellow  [] Red  [] Type/ Screen  [] ABG  [] VBG    [] COVID-19 swab    [] Rapid  [] PCR  [] Flu swab  [] Peds Viral Panel     [] Urine Sample  [] Pelvic Cultures  [] Blood Cultures  [] X 2  [] STREP Cultures         Susan Degroot RN  11/08/22 3312
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
## 2022-11-09 NOTE — PROGRESS NOTES
Occupational [facility]  Occupational Therapy Not Seen Note    DATE: 2022    NAME: Leidy Jhaveri  MRN: [id number]   : 1970      Patient not seen this date for Occupational Therapy due to:    Surgery/Procedure: Per chart: \"Tentative plans for the OR today,  with Dr. Arslan Prakash pending risk stratification from primary and cardiology evaluation. \" Additionally, L distal femur currently in traction.      Next Scheduled Treatment: 11/10/2022     Electronically signed by Prosper Oseguera OT on 2022 at 7:52 AM
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
## 2022-11-10 ENCOUNTER — APPOINTMENT (OUTPATIENT)
Dept: MRI IMAGING | Age: 52
DRG: 320 | End: 2022-11-10
Payer: COMMERCIAL

## 2022-11-10 LAB
ABO/RH: NORMAL
ABSOLUTE EOS #: 0 K/UL (ref 0–0.44)
ABSOLUTE IMMATURE GRANULOCYTE: 0.17 K/UL (ref 0–0.3)
ABSOLUTE LYMPH #: 1.37 K/UL (ref 1.1–3.7)
ABSOLUTE MONO #: 2.05 K/UL (ref 0.1–1.2)
ANION GAP SERPL CALCULATED.3IONS-SCNC: 10 MMOL/L (ref 9–17)
ANTIBODY SCREEN: NEGATIVE
ARM BAND NUMBER: NORMAL
BASOPHILS # BLD: 0 % (ref 0–2)
BASOPHILS ABSOLUTE: 0 K/UL (ref 0–0.2)
BLD PROD TYP BPU: NORMAL
BLD PROD TYP BPU: NORMAL
BPU ID: NORMAL
BPU ID: NORMAL
BUN BLDV-MCNC: 15 MG/DL (ref 6–20)
CALCIUM SERPL-MCNC: 8.3 MG/DL (ref 8.6–10.4)
CHLORIDE BLD-SCNC: 102 MMOL/L (ref 98–107)
CO2: 18 MMOL/L (ref 20–31)
CREAT SERPL-MCNC: 1.08 MG/DL (ref 0.7–1.2)
CROSSMATCH RESULT: NORMAL
CROSSMATCH RESULT: NORMAL
DISPENSE STATUS BLOOD BANK: NORMAL
DISPENSE STATUS BLOOD BANK: NORMAL
EKG ATRIAL RATE: 95 BPM
EKG P AXIS: 63 DEGREES
EKG P-R INTERVAL: 160 MS
EKG Q-T INTERVAL: 404 MS
EKG QRS DURATION: 104 MS
EKG QTC CALCULATION (BAZETT): 507 MS
EKG R AXIS: -64 DEGREES
EKG T AXIS: 90 DEGREES
EKG VENTRICULAR RATE: 95 BPM
EOSINOPHILS RELATIVE PERCENT: 0 % (ref 1–4)
EXPIRATION DATE: NORMAL
GFR SERPL CREATININE-BSD FRML MDRD: >60 ML/MIN/1.73M2
GLUCOSE BLD-MCNC: 124 MG/DL (ref 75–110)
GLUCOSE BLD-MCNC: 128 MG/DL (ref 75–110)
GLUCOSE BLD-MCNC: 129 MG/DL (ref 75–110)
GLUCOSE BLD-MCNC: 139 MG/DL (ref 75–110)
GLUCOSE BLD-MCNC: 140 MG/DL (ref 75–110)
GLUCOSE BLD-MCNC: 144 MG/DL (ref 75–110)
GLUCOSE BLD-MCNC: 161 MG/DL (ref 75–110)
GLUCOSE BLD-MCNC: 171 MG/DL (ref 75–110)
GLUCOSE BLD-MCNC: 174 MG/DL (ref 75–110)
GLUCOSE BLD-MCNC: 180 MG/DL (ref 75–110)
GLUCOSE BLD-MCNC: 187 MG/DL (ref 75–110)
GLUCOSE BLD-MCNC: 193 MG/DL (ref 70–99)
GLUCOSE BLD-MCNC: 195 MG/DL (ref 75–110)
GLUCOSE BLD-MCNC: 198 MG/DL (ref 75–110)
GLUCOSE BLD-MCNC: 222 MG/DL (ref 75–110)
GLUCOSE BLD-MCNC: 229 MG/DL (ref 75–110)
HCT VFR BLD CALC: 40 % (ref 40.7–50.3)
HEMOGLOBIN: 12.6 G/DL (ref 13–17)
IMMATURE GRANULOCYTES: 1 %
LYMPHOCYTES # BLD: 8 % (ref 24–43)
MCH RBC QN AUTO: 27.2 PG (ref 25.2–33.5)
MCHC RBC AUTO-ENTMCNC: 31.5 G/DL (ref 28.4–34.8)
MCV RBC AUTO: 86.2 FL (ref 82.6–102.9)
MONOCYTES # BLD: 12 % (ref 3–12)
MORPHOLOGY: NORMAL
NRBC AUTOMATED: 0 PER 100 WBC
PDW BLD-RTO: 13.8 % (ref 11.8–14.4)
PLATELET # BLD: 235 K/UL (ref 138–453)
PMV BLD AUTO: 10.1 FL (ref 8.1–13.5)
POTASSIUM SERPL-SCNC: 4.6 MMOL/L (ref 3.7–5.3)
RBC # BLD: 4.64 M/UL (ref 4.21–5.77)
SEG NEUTROPHILS: 79 % (ref 36–65)
SEGMENTED NEUTROPHILS ABSOLUTE COUNT: 13.51 K/UL (ref 1.5–8.1)
SODIUM BLD-SCNC: 130 MMOL/L (ref 135–144)
TRANSFUSION STATUS: NORMAL
TRANSFUSION STATUS: NORMAL
UNIT DIVISION: 0
UNIT DIVISION: 0
WBC # BLD: 17.1 K/UL (ref 3.5–11.3)

## 2022-11-10 PROCEDURE — 6360000002 HC RX W HCPCS

## 2022-11-10 PROCEDURE — 93005 ELECTROCARDIOGRAM TRACING: CPT | Performed by: STUDENT IN AN ORGANIZED HEALTH CARE EDUCATION/TRAINING PROGRAM

## 2022-11-10 PROCEDURE — 80048 BASIC METABOLIC PNL TOTAL CA: CPT

## 2022-11-10 PROCEDURE — 6370000000 HC RX 637 (ALT 250 FOR IP)

## 2022-11-10 PROCEDURE — 2580000003 HC RX 258

## 2022-11-10 PROCEDURE — 2580000003 HC RX 258: Performed by: STUDENT IN AN ORGANIZED HEALTH CARE EDUCATION/TRAINING PROGRAM

## 2022-11-10 PROCEDURE — 36415 COLL VENOUS BLD VENIPUNCTURE: CPT

## 2022-11-10 PROCEDURE — 6360000002 HC RX W HCPCS: Performed by: STUDENT IN AN ORGANIZED HEALTH CARE EDUCATION/TRAINING PROGRAM

## 2022-11-10 PROCEDURE — 2700000000 HC OXYGEN THERAPY PER DAY

## 2022-11-10 PROCEDURE — 6370000000 HC RX 637 (ALT 250 FOR IP): Performed by: NURSE PRACTITIONER

## 2022-11-10 PROCEDURE — 73721 MRI JNT OF LWR EXTRE W/O DYE: CPT

## 2022-11-10 PROCEDURE — 6370000000 HC RX 637 (ALT 250 FOR IP): Performed by: STUDENT IN AN ORGANIZED HEALTH CARE EDUCATION/TRAINING PROGRAM

## 2022-11-10 PROCEDURE — 85025 COMPLETE CBC W/AUTO DIFF WBC: CPT

## 2022-11-10 PROCEDURE — 51798 US URINE CAPACITY MEASURE: CPT

## 2022-11-10 PROCEDURE — 2060000000 HC ICU INTERMEDIATE R&B

## 2022-11-10 PROCEDURE — 94761 N-INVAS EAR/PLS OXIMETRY MLT: CPT

## 2022-11-10 PROCEDURE — 82947 ASSAY GLUCOSE BLOOD QUANT: CPT

## 2022-11-10 PROCEDURE — 2580000003 HC RX 258: Performed by: NURSE PRACTITIONER

## 2022-11-10 RX ORDER — SODIUM CHLORIDE 9 MG/ML
INJECTION, SOLUTION INTRAVENOUS CONTINUOUS
Status: DISCONTINUED | OUTPATIENT
Start: 2022-11-10 | End: 2022-11-13

## 2022-11-10 RX ORDER — 0.9 % SODIUM CHLORIDE 0.9 %
500 INTRAVENOUS SOLUTION INTRAVENOUS ONCE
Status: COMPLETED | OUTPATIENT
Start: 2022-11-10 | End: 2022-11-10

## 2022-11-10 RX ORDER — LISINOPRIL 20 MG/1
20 TABLET ORAL DAILY
Status: DISCONTINUED | OUTPATIENT
Start: 2022-11-10 | End: 2022-11-17 | Stop reason: HOSPADM

## 2022-11-10 RX ORDER — METHOCARBAMOL 750 MG/1
750 TABLET, FILM COATED ORAL EVERY 6 HOURS
Status: DISCONTINUED | OUTPATIENT
Start: 2022-11-10 | End: 2022-11-17 | Stop reason: HOSPADM

## 2022-11-10 RX ORDER — FUROSEMIDE 20 MG/1
20 TABLET ORAL DAILY
Status: DISCONTINUED | OUTPATIENT
Start: 2022-11-10 | End: 2022-11-17 | Stop reason: HOSPADM

## 2022-11-10 RX ORDER — GABAPENTIN 300 MG/1
300 CAPSULE ORAL EVERY 8 HOURS
Status: DISCONTINUED | OUTPATIENT
Start: 2022-11-10 | End: 2022-11-17 | Stop reason: HOSPADM

## 2022-11-10 RX ORDER — NAPROXEN 250 MG/1
500 TABLET ORAL 2 TIMES DAILY WITH MEALS
Status: DISCONTINUED | OUTPATIENT
Start: 2022-11-10 | End: 2022-11-17 | Stop reason: HOSPADM

## 2022-11-10 RX ADMIN — SODIUM CHLORIDE: 9 INJECTION, SOLUTION INTRAVENOUS at 11:57

## 2022-11-10 RX ADMIN — OXYCODONE 5 MG: 5 TABLET ORAL at 10:07

## 2022-11-10 RX ADMIN — METHOCARBAMOL TABLETS 750 MG: 750 TABLET, COATED ORAL at 07:54

## 2022-11-10 RX ADMIN — METHOCARBAMOL TABLETS 750 MG: 750 TABLET, COATED ORAL at 21:32

## 2022-11-10 RX ADMIN — HYDRALAZINE HYDROCHLORIDE 10 MG: 20 INJECTION INTRAMUSCULAR; INTRAVENOUS at 06:12

## 2022-11-10 RX ADMIN — ACETAMINOPHEN 1000 MG: 500 TABLET ORAL at 21:35

## 2022-11-10 RX ADMIN — ACETAMINOPHEN 1000 MG: 500 TABLET ORAL at 00:04

## 2022-11-10 RX ADMIN — SODIUM CHLORIDE 500 ML: 9 INJECTION, SOLUTION INTRAVENOUS at 21:24

## 2022-11-10 RX ADMIN — ACETAMINOPHEN 1000 MG: 500 TABLET ORAL at 13:19

## 2022-11-10 RX ADMIN — OXYCODONE 5 MG: 5 TABLET ORAL at 13:22

## 2022-11-10 RX ADMIN — FAMOTIDINE 20 MG: 20 TABLET, FILM COATED ORAL at 21:32

## 2022-11-10 RX ADMIN — ACETAMINOPHEN 1000 MG: 500 TABLET ORAL at 05:14

## 2022-11-10 RX ADMIN — DESMOPRESSIN ACETATE 40 MG: 0.2 TABLET ORAL at 07:54

## 2022-11-10 RX ADMIN — FENTANYL CITRATE 50 MCG: 50 INJECTION INTRAMUSCULAR; INTRAVENOUS at 10:37

## 2022-11-10 RX ADMIN — GABAPENTIN 300 MG: 300 CAPSULE ORAL at 21:32

## 2022-11-10 RX ADMIN — SODIUM CHLORIDE, PRESERVATIVE FREE 10 ML: 5 INJECTION INTRAVENOUS at 21:32

## 2022-11-10 RX ADMIN — OXYCODONE 5 MG: 5 TABLET ORAL at 05:14

## 2022-11-10 RX ADMIN — METHOCARBAMOL TABLETS 750 MG: 750 TABLET, COATED ORAL at 13:19

## 2022-11-10 RX ADMIN — DEXTROSE AND SODIUM CHLORIDE: 5; 450 INJECTION, SOLUTION INTRAVENOUS at 00:07

## 2022-11-10 RX ADMIN — GABAPENTIN 300 MG: 300 CAPSULE ORAL at 13:22

## 2022-11-10 RX ADMIN — FUROSEMIDE 20 MG: 20 TABLET ORAL at 08:56

## 2022-11-10 RX ADMIN — SODIUM CHLORIDE, PRESERVATIVE FREE 10 ML: 5 INJECTION INTRAVENOUS at 08:06

## 2022-11-10 RX ADMIN — AMLODIPINE BESYLATE 10 MG: 10 TABLET ORAL at 07:55

## 2022-11-10 RX ADMIN — DEXTROSE AND SODIUM CHLORIDE: 5; 450 INJECTION, SOLUTION INTRAVENOUS at 10:10

## 2022-11-10 RX ADMIN — ENOXAPARIN SODIUM 30 MG: 100 INJECTION SUBCUTANEOUS at 07:55

## 2022-11-10 RX ADMIN — CALCIUM GLUCONATE 2000 MG: 98 INJECTION, SOLUTION INTRAVENOUS at 00:59

## 2022-11-10 RX ADMIN — FAMOTIDINE 20 MG: 20 TABLET, FILM COATED ORAL at 07:54

## 2022-11-10 RX ADMIN — ENOXAPARIN SODIUM 30 MG: 100 INJECTION SUBCUTANEOUS at 21:33

## 2022-11-10 RX ADMIN — SODIUM CHLORIDE 1.92 UNITS/HR: 9 INJECTION, SOLUTION INTRAVENOUS at 22:19

## 2022-11-10 RX ADMIN — CARVEDILOL 25 MG: 25 TABLET, FILM COATED ORAL at 07:55

## 2022-11-10 RX ADMIN — LISINOPRIL 20 MG: 20 TABLET ORAL at 08:56

## 2022-11-10 ASSESSMENT — PAIN DESCRIPTION - LOCATION
LOCATION: HIP;ABDOMEN
LOCATION: ABDOMEN;LEG
LOCATION: HIP;KNEE

## 2022-11-10 ASSESSMENT — PAIN SCALES - GENERAL
PAINLEVEL_OUTOF10: 7
PAINLEVEL_OUTOF10: 6
PAINLEVEL_OUTOF10: 7
PAINLEVEL_OUTOF10: 8
PAINLEVEL_OUTOF10: 7
PAINLEVEL_OUTOF10: 4
PAINLEVEL_OUTOF10: 7
PAINLEVEL_OUTOF10: 7

## 2022-11-10 ASSESSMENT — PAIN DESCRIPTION - ORIENTATION
ORIENTATION: LEFT
ORIENTATION: LEFT

## 2022-11-10 NOTE — PLAN OF CARE
Problem: Discharge Planning  Goal: Discharge to home or other facility with appropriate resources  11/10/2022 0911 by David Laurent RN  Outcome: Progressing     Problem: Pain  Goal: Verbalizes/displays adequate comfort level or baseline comfort level  11/10/2022 0911 by David Laurent RN  Outcome: Progressing     Problem: Cardiovascular - Adult  Goal: Maintains optimal cardiac output and hemodynamic stability  Outcome: Progressing     Problem: Cardiovascular - Adult  Goal: Absence of cardiac dysrhythmias or at baseline  Outcome: Progressing     Problem: Skin/Tissue Integrity - Adult  Goal: Skin integrity remains intact  Outcome: Progressing     Problem: Skin/Tissue Integrity - Adult  Goal: Incisions, wounds, or drain sites healing without S/S of infection  Outcome: Progressing     Problem: Skin/Tissue Integrity - Adult  Goal: Oral mucous membranes remain intact  Outcome: Progressing     Problem: Musculoskeletal - Adult  Goal: Return ADL status to a safe level of function  Outcome: Progressing     Problem: Musculoskeletal - Adult  Goal: Maintain proper alignment of affected body part  Outcome: Progressing

## 2022-11-10 NOTE — CARE COORDINATION
11/10/22 0909   Service Assessment   Patient Orientation Alert and Oriented;Person;Place;Situation   Cognition Alert   History Provided By Patient   Primary Caregiver Self   Support Systems Family Members  (su Wooten)   Prior Functional Level Independent in ADLs/IADLs   Current Functional Level Assistance with the following:   Can patient return to prior living arrangement Yes   Ability to make needs known: Fair   Family able to assist with home care needs: Yes  (su Wooten)   Would you like for me to discuss the discharge plan with any other family members/significant others, and if so, who? No   Financial Resources Medicaid; Other (Comment)  (auto insurance and medicaid)   Social/Functional History   Lives With Family  (lives with su Wooten. spouse is currently in [facility] in [place])   Type of [address] Two level; Able to Live on Main level with bedroom/bathroom   Home Access Stairs to enter without rails   Entrance Stairs - Number of Steps 3   Receives Help From [address] Responsibilities Yes   Ambulation Assistance Independent   Transfer Assistance Independent   Active  Yes   Mode of Transportation Car   Discharge Planning   Type of Residence House   Living Arrangements Family Members  (su Wooten)   Current Services Prior To Admission Durable Medical Equipment  (glucometer)   Current DME Prior to [employer]  (has glucometer with all necessary supplies)   Potential [address]   DME Ordered? No   Potential Assistance Purchasing Medications No   Type of Home Care Services None   Patient expects to be discharged to: Skilled nursing facility   One/Two Story Residence Two story   Lift Chair Available No   History of falls?  0   Services At/After Discharge   Transition of Care Consult (CM Consult) SNF   Partner SNF No   Reason Why Partner SNF Not Chosen Location [facility] [employer] Information Provided? No   Mode of Transport at Discharge Other (see comment)  (dependent on discharge disposition)   Condition of Participation: Discharge Planning   The Plan for Transition of Care is related to the following treatment goals: comfort   The Patient and/or Patient Representative was provided with a Choice of Provider? Patient   The Patient and/Or Patient Representative agree with the Discharge Plan? Yes   Freedom of Choice list was provided with basic dialogue that supports the patient's individualized plan of care/goals, treatment preferences, and shares the quality data associated with the providers? Yes       Met with patient to discuss transitional planning. Patient lives with his spouse and his nephew Ester Calzada in a two story home. The patient reports previously independent with ADL's. He further states his wife recently had a stroke and is currently at [facility] in [place], [place]. Discussed possible need for short term rehab/ SNF. Patient offered freedom of choice and would like referral made to [facility] in [place] if he requires SNF. Referral sent as requested. Patient agreeable to plan.

## 2022-11-10 NOTE — PROGRESS NOTES
120cc               -Activity/ weight bearing status: WBAT RLE, TTWB LLE              -PT/OT ordered   -MRI L knee 11/10 per ortho: pending     8. ID  -Tmax: 37.1C  -WBC: 17.1 (22.5)  -Abx: periop Ancef      9. ENDO   -DM2 (A1c 8.6)               -B-214  -Insulin gtt required 113.5 units/24 hours    10. Lines  - PIV x2  -[place]      11. PPX:  -DVT: Lovenox  -GI: pepcid 20 mg BID     12. CONSULTS              -ortho   -cardiology: s/o     13. DISPO:               -TICU     CHECKLIST  CAM-ICU RASS:   RESTRAINTS:   IVF:   NUTRITION:   ANTIBIOTICS:   GI:   DVT:   GLYCEMIC CONTROL:   HOB >45:   MOBILITY:   SBT:   IS:     Chief Complaint: \"rough\"    SUBJECTIVE    Kate Boom  was evaluated at bedside this morning. He states he is feeling rough. Has pain in his left knee. Otherwise, pain is controlled. Denies CP, SOB, numbness/tingling.     OBJECTIVE  VITALS: Temp: Temp: 99 °F (37.2 °C)Temp  Av.3 °F (36.8 °C)  Min: 97.8 °F (36.6 °C)  Max: 99 °F (38.0 °C) BP Systolic (53YUR), SHJ:065 , Min:137 , DXM:370   Diastolic (69JBN), CHJ:835, Min:83, Max:116   Pulse Pulse  Av.3  Min: 86  Max: 114 Resp Resp  Av.4  Min: 13  Max: 24 Pulse ox SpO2  Av.3 %  Min: 92 %  Max: 100 %    CONSTITUTIONAL: lying flat in bed, sleeping but awakes to voice, in no acute distress  HEENT: atraumatic, PERRL  LUNGS: on 4L NC, unlabored   CV: RRR no MRG  GI: soft, nontender to palpation  MUSCULOSKELETAL: DP pulses 2+, sensation intact to touch bilateral LE, wiggles toes  NEUROLOGIC: GCS 15, no gross neurologic deficits  SKIN: warm and dry      LAB:  CBC:   Recent Labs     22  0438 22  1650 11/09/22  2014 11/10/22  0258   WBC 14.1*  --  22.5* 17.1*   HGB 14.0 12.3* 13.0 12.6*   HCT 43.5 37.9* 41.5 40.0*   MCV 84.3  --  88.3 86.2     --  235 235     BMP:   Recent Labs     22  0438 22  1650 11/09/22  2014 11/10/22  0258   * 135* 137 130*   K 4.2 4.3 4.4 4.6     --  108* 102 CO2 22  --  20 18*   BUN 12  --  15 15   CREATININE 0.87  --  1.17 1.08   GLUCOSE 320*  --  214* 193*       RADIOLOGY:  MRI L knee 11/10 pending      Andrea Logan MD  11/9/22, 7:58 AM        Trauma Attending Margarita Lr      I have reviewed the above GCS note(s) and confirmed the key elements of the medical history and physical exam. I have seen and examined the pt. I have discussed the findings, established the care plan and recommendations with Resident.   Adjust pain meds   PT/OT   Start diet   Transfer out of ICU     Brain Grimm DO  11/10/2022  11:46 AM
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
## 2022-11-10 NOTE — DISCHARGE INSTRUCTIONS
Discharge Instructions for Trauma       What to do after you leave the hospital:    Please continue to use your Incentive Spirometer as directed. You can practice 10 deep breaths/hour while awake. Using the [address] will promote the health of your lungs by taking slow, deep breaths in. It is also important in preventing pneumonia or a pneumothorax from developing. General questions or concerns may be called to the trauma nurse line at [phone] and please leave a message. Trauma is a life-threatening condition. Your doctor will want to closely monitor you. Be sure to go to all of your appointments. Orthopedic Instructions:  -Weight bearing status: Weight bearing as tolerated right leg and Toe touch weight bearing left leg  -Keep dressing clean and dry.  -Starting 3 days after surgery, Ok for daily dressing changes until wound is dry. Then leave open to air.  -Always look for signs of compartment syndrome: pain out of proportion to the injury, pain not controlled with pain medication, numbness in digits, changing of color of digits (paleness). If these signs occur return to ED immediately for reassessment.   -Always work on motion (to non-injured toes) to decrease swelling.  -Starting 3 days after surgery, Ok to shower but no soaks in a bath, hot tub, pool, etc  -Ice (20 minutes on and off 1 hour) and elevate above the level of the heart to reduce swelling and throbbing pain. -Drink plenty of fluids.  -Call the office or come to Emergency Room if signs of infection appear (hot, swollen, red, draining pus, fever)  -Take medications as prescribed.  -Wean off narcotics (percocet/norco) as soon as possible. Do not take tylenol if still taking narcotics.  -Follow up with Dr. Ricki Rivas in their office  11/22/22 at 3:30pm  after surgery. Call [phone] to schedule/confirm.

## 2022-11-10 NOTE — PLAN OF CARE
Problem: Discharge Planning  Goal: Discharge to home or other facility with appropriate resources  11/10/2022 0346 by Polo Mcelroy RN  Outcome: Progressing  11/10/2022 0340 by Polo Mcelroy RN  Outcome: Progressing     Problem: Pain  Goal: Verbalizes/displays adequate comfort level or baseline comfort level  11/10/2022 0346 by Polo Mcelroy RN  Outcome: Progressing  11/10/2022 0340 by Polo Mcelroy RN  Outcome: Progressing
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
## 2022-11-10 NOTE — PROGRESS NOTES
Physical Therapy        Physical Therapy Cancel Note      DATE: 11/10/2022    NAME: sEtephania Davalos  MRN: [id number]   : 1970      Patient not seen this date for Physical Therapy due to: Other: MRI L knee, await results. Ck pm as able.       Electronically signed by Jessee Silva PT on  at 8:00 AM
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
## 2022-11-10 NOTE — PROGRESS NOTES
Occupational [address]  Occupational Therapy Not Seen Note    DATE: 11/10/2022    NAME: Kate Saucedo  MRN: [id number]   : 1970      Patient not seen this date for Occupational Therapy due to:    Testing: Await MRI of L knee.     Next Scheduled Treatment: Will check back in PM or 2022     Electronically signed by Dalton Yanez OT on 11/10/2022 at 9:19 AM
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
## 2022-11-10 NOTE — PROGRESS NOTES
Patient received into room. Patient very drowsy, but answers appropriately to speech. Denies pain currently.  Surgical resident informed of patients' arrival.

## 2022-11-11 ENCOUNTER — APPOINTMENT (OUTPATIENT)
Dept: GENERAL RADIOLOGY | Age: 52
DRG: 320 | End: 2022-11-11
Payer: COMMERCIAL

## 2022-11-11 LAB
ABSOLUTE EOS #: 0.15 K/UL (ref 0–0.44)
ABSOLUTE IMMATURE GRANULOCYTE: 0.15 K/UL (ref 0–0.3)
ABSOLUTE LYMPH #: 1.76 K/UL (ref 1.1–3.7)
ABSOLUTE MONO #: 2.21 K/UL (ref 0.1–1.2)
ANION GAP SERPL CALCULATED.3IONS-SCNC: 8 MMOL/L (ref 9–17)
ANION GAP SERPL CALCULATED.3IONS-SCNC: 8 MMOL/L (ref 9–17)
ANION GAP SERPL CALCULATED.3IONS-SCNC: 9 MMOL/L (ref 9–17)
BASOPHILS # BLD: 0 % (ref 0–2)
BASOPHILS ABSOLUTE: 0 K/UL (ref 0–0.2)
BUN BLDV-MCNC: 25 MG/DL (ref 6–20)
BUN BLDV-MCNC: 26 MG/DL (ref 6–20)
BUN BLDV-MCNC: 26 MG/DL (ref 6–20)
CALCIUM SERPL-MCNC: 7.6 MG/DL (ref 8.6–10.4)
CALCIUM SERPL-MCNC: 7.8 MG/DL (ref 8.6–10.4)
CALCIUM SERPL-MCNC: 7.8 MG/DL (ref 8.6–10.4)
CHLORIDE BLD-SCNC: 102 MMOL/L (ref 98–107)
CHLORIDE BLD-SCNC: 103 MMOL/L (ref 98–107)
CHLORIDE BLD-SCNC: 105 MMOL/L (ref 98–107)
CO2: 19 MMOL/L (ref 20–31)
CO2: 20 MMOL/L (ref 20–31)
CO2: 23 MMOL/L (ref 20–31)
CREAT SERPL-MCNC: 1.23 MG/DL (ref 0.7–1.2)
CREAT SERPL-MCNC: 1.53 MG/DL (ref 0.7–1.2)
CREAT SERPL-MCNC: 1.56 MG/DL (ref 0.7–1.2)
EKG ATRIAL RATE: 85 BPM
EKG ATRIAL RATE: 89 BPM
EKG P AXIS: 44 DEGREES
EKG P AXIS: 53 DEGREES
EKG P-R INTERVAL: 136 MS
EKG P-R INTERVAL: 156 MS
EKG Q-T INTERVAL: 416 MS
EKG Q-T INTERVAL: 430 MS
EKG QRS DURATION: 100 MS
EKG QRS DURATION: 104 MS
EKG QTC CALCULATION (BAZETT): 495 MS
EKG QTC CALCULATION (BAZETT): 523 MS
EKG R AXIS: -41 DEGREES
EKG R AXIS: -63 DEGREES
EKG T AXIS: 103 DEGREES
EKG T AXIS: 83 DEGREES
EKG VENTRICULAR RATE: 85 BPM
EKG VENTRICULAR RATE: 89 BPM
EOSINOPHILS RELATIVE PERCENT: 1 % (ref 1–4)
GFR SERPL CREATININE-BSD FRML MDRD: 52 ML/MIN/1.73M2
GFR SERPL CREATININE-BSD FRML MDRD: 54 ML/MIN/1.73M2
GFR SERPL CREATININE-BSD FRML MDRD: >60 ML/MIN/1.73M2
GLUCOSE BLD-MCNC: 121 MG/DL (ref 75–110)
GLUCOSE BLD-MCNC: 126 MG/DL (ref 75–110)
GLUCOSE BLD-MCNC: 129 MG/DL (ref 75–110)
GLUCOSE BLD-MCNC: 130 MG/DL (ref 75–110)
GLUCOSE BLD-MCNC: 131 MG/DL (ref 75–110)
GLUCOSE BLD-MCNC: 135 MG/DL (ref 75–110)
GLUCOSE BLD-MCNC: 136 MG/DL (ref 75–110)
GLUCOSE BLD-MCNC: 138 MG/DL (ref 75–110)
GLUCOSE BLD-MCNC: 141 MG/DL (ref 75–110)
GLUCOSE BLD-MCNC: 142 MG/DL (ref 75–110)
GLUCOSE BLD-MCNC: 144 MG/DL (ref 75–110)
GLUCOSE BLD-MCNC: 146 MG/DL (ref 75–110)
GLUCOSE BLD-MCNC: 147 MG/DL (ref 70–99)
GLUCOSE BLD-MCNC: 147 MG/DL (ref 75–110)
GLUCOSE BLD-MCNC: 149 MG/DL (ref 70–99)
GLUCOSE BLD-MCNC: 150 MG/DL (ref 75–110)
GLUCOSE BLD-MCNC: 151 MG/DL (ref 75–110)
GLUCOSE BLD-MCNC: 154 MG/DL (ref 75–110)
GLUCOSE BLD-MCNC: 156 MG/DL (ref 75–110)
GLUCOSE BLD-MCNC: 157 MG/DL (ref 70–99)
GLUCOSE BLD-MCNC: 165 MG/DL (ref 75–110)
GLUCOSE BLD-MCNC: 179 MG/DL (ref 75–110)
GLUCOSE BLD-MCNC: 192 MG/DL (ref 75–110)
GLUCOSE BLD-MCNC: 199 MG/DL (ref 75–110)
GLUCOSE BLD-MCNC: 230 MG/DL (ref 75–110)
GLUCOSE BLD-MCNC: 314 MG/DL (ref 75–110)
HCT VFR BLD CALC: 30.9 % (ref 40.7–50.3)
HCT VFR BLD CALC: 32.5 % (ref 40.7–50.3)
HEMOGLOBIN: 9.8 G/DL (ref 13–17)
HEMOGLOBIN: 9.9 G/DL (ref 13–17)
IMMATURE GRANULOCYTES: 1 %
LACTIC ACID, WHOLE BLOOD: 0.9 MMOL/L (ref 0.7–2.1)
LYMPHOCYTES # BLD: 12 % (ref 24–43)
MCH RBC QN AUTO: 27.7 PG (ref 25.2–33.5)
MCHC RBC AUTO-ENTMCNC: 30.2 G/DL (ref 28.4–34.8)
MCV RBC AUTO: 91.8 FL (ref 82.6–102.9)
MONOCYTES # BLD: 15 % (ref 3–12)
MORPHOLOGY: NORMAL
NRBC AUTOMATED: 0 PER 100 WBC
PDW BLD-RTO: 14.2 % (ref 11.8–14.4)
PLATELET # BLD: 212 K/UL (ref 138–453)
PMV BLD AUTO: 10.5 FL (ref 8.1–13.5)
POTASSIUM SERPL-SCNC: 4.3 MMOL/L (ref 3.7–5.3)
POTASSIUM SERPL-SCNC: 4.4 MMOL/L (ref 3.7–5.3)
POTASSIUM SERPL-SCNC: 4.4 MMOL/L (ref 3.7–5.3)
PRO-BNP: 230 PG/ML
RBC # BLD: 3.54 M/UL (ref 4.21–5.77)
SEG NEUTROPHILS: 71 % (ref 36–65)
SEGMENTED NEUTROPHILS ABSOLUTE COUNT: 10.43 K/UL (ref 1.5–8.1)
SODIUM BLD-SCNC: 130 MMOL/L (ref 135–144)
SODIUM BLD-SCNC: 133 MMOL/L (ref 135–144)
SODIUM BLD-SCNC: 134 MMOL/L (ref 135–144)
TROPONIN, HIGH SENSITIVITY: 53 NG/L (ref 0–22)
WBC # BLD: 14.7 K/UL (ref 3.5–11.3)

## 2022-11-11 PROCEDURE — 2580000003 HC RX 258

## 2022-11-11 PROCEDURE — 2060000000 HC ICU INTERMEDIATE R&B

## 2022-11-11 PROCEDURE — 93010 ELECTROCARDIOGRAM REPORT: CPT | Performed by: INTERNAL MEDICINE

## 2022-11-11 PROCEDURE — 6370000000 HC RX 637 (ALT 250 FOR IP)

## 2022-11-11 PROCEDURE — 6370000000 HC RX 637 (ALT 250 FOR IP): Performed by: STUDENT IN AN ORGANIZED HEALTH CARE EDUCATION/TRAINING PROGRAM

## 2022-11-11 PROCEDURE — 71045 X-RAY EXAM CHEST 1 VIEW: CPT

## 2022-11-11 PROCEDURE — 36415 COLL VENOUS BLD VENIPUNCTURE: CPT

## 2022-11-11 PROCEDURE — 97535 SELF CARE MNGMENT TRAINING: CPT

## 2022-11-11 PROCEDURE — 80048 BASIC METABOLIC PNL TOTAL CA: CPT

## 2022-11-11 PROCEDURE — 2580000003 HC RX 258: Performed by: STUDENT IN AN ORGANIZED HEALTH CARE EDUCATION/TRAINING PROGRAM

## 2022-11-11 PROCEDURE — 97166 OT EVAL MOD COMPLEX 45 MIN: CPT

## 2022-11-11 PROCEDURE — 83605 ASSAY OF LACTIC ACID: CPT

## 2022-11-11 PROCEDURE — 83880 ASSAY OF NATRIURETIC PEPTIDE: CPT

## 2022-11-11 PROCEDURE — 97530 THERAPEUTIC ACTIVITIES: CPT

## 2022-11-11 PROCEDURE — 85025 COMPLETE CBC W/AUTO DIFF WBC: CPT

## 2022-11-11 PROCEDURE — 82947 ASSAY GLUCOSE BLOOD QUANT: CPT

## 2022-11-11 PROCEDURE — 2580000003 HC RX 258: Performed by: NURSE PRACTITIONER

## 2022-11-11 PROCEDURE — 84484 ASSAY OF TROPONIN QUANT: CPT

## 2022-11-11 PROCEDURE — 97162 PT EVAL MOD COMPLEX 30 MIN: CPT

## 2022-11-11 PROCEDURE — 6370000000 HC RX 637 (ALT 250 FOR IP): Performed by: NURSE PRACTITIONER

## 2022-11-11 PROCEDURE — 97110 THERAPEUTIC EXERCISES: CPT

## 2022-11-11 PROCEDURE — 6360000002 HC RX W HCPCS

## 2022-11-11 PROCEDURE — 2700000000 HC OXYGEN THERAPY PER DAY

## 2022-11-11 PROCEDURE — 85014 HEMATOCRIT: CPT

## 2022-11-11 PROCEDURE — 85018 HEMOGLOBIN: CPT

## 2022-11-11 RX ORDER — SODIUM CHLORIDE, SODIUM LACTATE, POTASSIUM CHLORIDE, AND CALCIUM CHLORIDE .6; .31; .03; .02 G/100ML; G/100ML; G/100ML; G/100ML
500 INJECTION, SOLUTION INTRAVENOUS ONCE
Status: COMPLETED | OUTPATIENT
Start: 2022-11-11 | End: 2022-11-11

## 2022-11-11 RX ORDER — SODIUM CHLORIDE, SODIUM LACTATE, POTASSIUM CHLORIDE, AND CALCIUM CHLORIDE .6; .31; .03; .02 G/100ML; G/100ML; G/100ML; G/100ML
500 INJECTION, SOLUTION INTRAVENOUS ONCE
Status: DISCONTINUED | OUTPATIENT
Start: 2022-11-11 | End: 2022-11-11

## 2022-11-11 RX ADMIN — SODIUM CHLORIDE, PRESERVATIVE FREE 10 ML: 5 INJECTION INTRAVENOUS at 20:26

## 2022-11-11 RX ADMIN — METHOCARBAMOL TABLETS 750 MG: 750 TABLET, COATED ORAL at 20:25

## 2022-11-11 RX ADMIN — ACETAMINOPHEN 1000 MG: 500 TABLET ORAL at 20:25

## 2022-11-11 RX ADMIN — NAPROXEN 500 MG: 250 TABLET ORAL at 18:11

## 2022-11-11 RX ADMIN — DESMOPRESSIN ACETATE 40 MG: 0.2 TABLET ORAL at 10:31

## 2022-11-11 RX ADMIN — OXYCODONE 5 MG: 5 TABLET ORAL at 02:05

## 2022-11-11 RX ADMIN — FUROSEMIDE 20 MG: 20 TABLET ORAL at 12:51

## 2022-11-11 RX ADMIN — SODIUM CHLORIDE 1.98 UNITS/HR: 9 INJECTION, SOLUTION INTRAVENOUS at 23:48

## 2022-11-11 RX ADMIN — METHOCARBAMOL TABLETS 750 MG: 750 TABLET, COATED ORAL at 02:05

## 2022-11-11 RX ADMIN — GABAPENTIN 300 MG: 300 CAPSULE ORAL at 20:25

## 2022-11-11 RX ADMIN — NAPROXEN 500 MG: 250 TABLET ORAL at 10:30

## 2022-11-11 RX ADMIN — GABAPENTIN 300 MG: 300 CAPSULE ORAL at 12:52

## 2022-11-11 RX ADMIN — FAMOTIDINE 20 MG: 20 TABLET, FILM COATED ORAL at 10:31

## 2022-11-11 RX ADMIN — SODIUM CHLORIDE, POTASSIUM CHLORIDE, SODIUM LACTATE AND CALCIUM CHLORIDE 500 ML: 600; 310; 30; 20 INJECTION, SOLUTION INTRAVENOUS at 02:11

## 2022-11-11 RX ADMIN — ACETAMINOPHEN 1000 MG: 500 TABLET ORAL at 04:59

## 2022-11-11 RX ADMIN — GABAPENTIN 300 MG: 300 CAPSULE ORAL at 04:58

## 2022-11-11 RX ADMIN — METHOCARBAMOL TABLETS 750 MG: 750 TABLET, COATED ORAL at 12:51

## 2022-11-11 RX ADMIN — ENOXAPARIN SODIUM 30 MG: 100 INJECTION SUBCUTANEOUS at 20:26

## 2022-11-11 RX ADMIN — SODIUM CHLORIDE 100 ML/HR: 9 INJECTION, SOLUTION INTRAVENOUS at 04:07

## 2022-11-11 RX ADMIN — SODIUM CHLORIDE, POTASSIUM CHLORIDE, SODIUM LACTATE AND CALCIUM CHLORIDE 500 ML: 600; 310; 30; 20 INJECTION, SOLUTION INTRAVENOUS at 01:05

## 2022-11-11 RX ADMIN — FAMOTIDINE 20 MG: 20 TABLET, FILM COATED ORAL at 20:25

## 2022-11-11 RX ADMIN — METHOCARBAMOL TABLETS 750 MG: 750 TABLET, COATED ORAL at 10:30

## 2022-11-11 RX ADMIN — ACETAMINOPHEN 1000 MG: 500 TABLET ORAL at 12:54

## 2022-11-11 RX ADMIN — ENOXAPARIN SODIUM 30 MG: 100 INJECTION SUBCUTANEOUS at 10:30

## 2022-11-11 ASSESSMENT — PAIN SCALES - GENERAL: PAINLEVEL_OUTOF10: 8
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
## 2022-11-11 NOTE — DISCHARGE INSTR - COC
Continuity of Care Form    Patient Name: Meek Mendoza   :  1970  MRN:  [id number]    [facility] date:  2022  Discharge date:  2022    Code Status Order: Full Code   Advance Directives:   [address] [facility] Documentation       Date/Time Healthcare Directive Type of Healthcare Directive Copy in [address] Agent's Name Healthcare Agent's Phone Number    [phone] Yes, patient has an advance directive for healthcare treatment Durable power of  for health care;Living will Yes, copy in chart -- -- --            Admitting Physician:  Preston Wilkerson MD  PCP: No primary care provider on file. Discharging Nurse: Marisol Oakley Unit/Room#: 3416/0996-90  Discharging Unit Phone Number: [phone]    Emergency Contact:   Extended Emergency Contact Information  Primary Emergency Contact: [address] [facility]  Mobile Phone: [phone]  Relation: Spouse  Secondary Emergency Contact: Zhen Ochoa  Mobile Phone: [phone]  Relation: Brother/Sister    Past Surgical History:  Past Surgical History:   Procedure Laterality Date    PELVIC FRACTURE SURGERY Left 2022    PELVIC FRACTURE SURGERY Left 2022    OPEN REDUCTION INTERNAL FIXATION ACETABULUM AND PELVIC RING performed by Marily Weller DO at [place] [address]       Immunization History: There is no immunization history on file for this patient.     Active Problems:  Patient Active Problem List   Diagnosis Code    [facility] closed [facility] S32.82XA    Hypertension I10    Hyperglycemia R73.9    Acetabulum fracture, left (H[facility]) S32.402A    Pelvic hematoma in male N50.1       Isolation/Infection:   Isolation            No Isolation          Patient Infection Status       None to display            Nurse Assessment:  Last Vital Signs: BP (!) 143/77   Pulse 92   Temp 98.6 °F (37 °C) (Oral)   Resp 17   Ht 5' 11\" (1.803 m)   Wt 286 lb 9.6 oz (130 kg) SpO2 97%   BMI 39.97 kg/m²     Last documented pain score (0-10 scale): Pain Level:  (no)  Last Weight:   Wt Readings from Last 1 Encounters:   11/11/22 286 lb 9.6 oz (130 kg)     Mental Status:  oriented, alert, and forgetful, Hallucinations     IV Access:  - None    Nursing Mobility/ADLs:  Walking   Dependent  Transfer  Assisted  Bathing  Dependent  Dressing  Dependent  Toileting  Dependent  Feeding  Independent  Med Admin  Assisted  Med Delivery   whole    Wound Care Documentation and Therapy:  Wound 11/09/22 Thigh Anterior;Distal;Left sites from where traction was (Active)   Dressing Status Clean;Dry; Intact 11/11/22 1200   Wound Cleansed Not Cleansed 11/11/22 1200   Dressing/Treatment Foam 11/11/22 1200   Wound Assessment Other (Comment) 11/11/22 1200   Drainage Amount None 11/11/22 1200   Number of days: 1       Incision 11/09/22 Pelvis Anterior; Left (Active)   Dressing Status Clean;Dry; Intact 11/11/22 1200   Dressing/Treatment Dry dressing 11/11/22 1200   Closure Adhesive bandage 11/11/22 1200   Margins Other (Comment) 11/10/22 0400   Incision Assessment Dry 11/11/22 1200   Drainage Amount None 11/11/22 1200   Odor None 11/11/22 1200   Number of days: 2        Elimination:  Continence: Bowel: No  Bladder: Yes  Urinary Catheter: None   Colostomy/Ileostomy/Ileal Conduit: No       Date of Last BM: 11/13/2022    Intake/Output Summary (Last 24 hours) at 11/11/2022 1637  Last data filed at 11/11/2022 1144  Gross per 24 hour   Intake 2598.94 ml   Output 1499 ml   Net 1099.94 ml     I/O last 3 completed shifts: In: 5922.6 [I.V.:4367.9; IV Piggyback:1554.6]  Out: 8647 [Urine:714; Drains:365; Blood:80]    Safety Concerns: At Risk for Falls and Delirium     Impairments/Disabilities:      None    Nutrition Therapy:  Current Nutrition Therapy:   - Oral Diet:  Carb Control 3 carbs/meal (1500kcals/day)    Routes of Feeding: Oral  Liquids:  Thin Liquids  Daily Fluid Restriction: yes - amount 1500  Last Modified Barium Swallow with Video (Video Swallowing Test): not done    Treatments at the Time of Hospital Discharge:   Respiratory Treatments: N/A  Oxygen Therapy:  is not on home oxygen therapy. Ventilator:    - No ventilator support    Rehab Therapies: Physical Therapy and Occupational Therapy  Weight Bearing Status/Restrictions: Touchdown weight bearing (10-25 lbs) only on left leg, WBAT Right leg  Other Medical Equipment (for information only, NOT a DME order):  walker  Other Treatments: N/A    Patient's personal belongings (please select all that are sent with patient):  Glasses, Cell phone, wallet    RN SIGNATURE:  Electronically signed by Rafiq Lui RN on 11/17/22 at 12:37 PM EST    CASE MANAGEMENT/SOCIAL WORK SECTION    Inpatient Status Date: 11/8/22    Readmission Risk Assessment Score:  Readmission Risk              Risk of Unplanned Readmission:  13           Discharging to Facility/ Agency   Name: [facility]  Address:  [facility]:[phone]   Fax:    Dialysis Facility (if applicable)   Name:  Address:  Dialysis Schedule:  Phone:  Fax:    / signature: Electronically signed by Jose Armando Jurado RN on 11/17/22 at 11:45 AM EST    PHYSICIAN SECTION    Prognosis: Good    Condition at Discharge: Stable    Rehab Potential (if transferring to Rehab): Good    Recommended Labs or Other Treatments After Discharge:     Physician Certification: I certify the above information and transfer of Kenya Choudhary  is necessary for the continuing treatment of the diagnosis listed and that he requires {Admit to Appropriate Level of Care:30222} for less 30 days.      Update Admission H&P: No change in H&P    PHYSICIAN SIGNATURE:  Electronically signed by Becca Aldana DO on 11/11/22 at 4:37 PM EST

## 2022-11-11 NOTE — PLAN OF CARE
Problem: Discharge Planning  Goal: Discharge to home or other facility with appropriate resources  Outcome: Progressing  Flowsheets (Taken 11/10/2022 2104)  Discharge to home or other facility with appropriate resources: Identify barriers to discharge with patient and caregiver     Problem: Pain  Goal: Verbalizes/displays adequate comfort level or baseline comfort level  Outcome: Progressing  Flowsheets (Taken 11/10/2022 1200 by Lorenza Damian RN)  Verbalizes/displays adequate comfort level or baseline comfort level: Administer analgesics based on type and severity of pain and evaluate response     Problem: Cardiovascular - Adult  Goal: Maintains optimal cardiac output and hemodynamic stability  Outcome: Progressing     Problem: Cardiovascular - Adult  Goal: Absence of cardiac dysrhythmias or at baseline  Outcome: Progressing     Problem: Skin/Tissue Integrity - Adult  Goal: Skin integrity remains intact  Outcome: Progressing  Flowsheets (Taken 11/10/2022 2104)  Skin Integrity Remains Intact: Monitor for areas of redness and/or skin breakdown     Problem: Skin/Tissue Integrity - Adult  Goal: Incisions, wounds, or drain sites healing without S/S of infection  Outcome: Progressing     Problem: Skin/Tissue Integrity - Adult  Goal: Oral mucous membranes remain intact  Outcome: Progressing     Problem: Musculoskeletal - Adult  Goal: Return mobility to safest level of function  Outcome: Progressing  Flowsheets (Taken 11/10/2022 2104)  Return Mobility to Safest Level of Function: Assess patient stability and activity tolerance for standing, transferring and ambulating with or without assistive devices     Problem: Musculoskeletal - Adult  Goal: Maintain proper alignment of affected body part  Outcome: Progressing     Problem: Musculoskeletal - Adult  Goal: Return ADL status to a safe level of function  Outcome: Progressing     Problem: Skin/Tissue Integrity  Goal: Absence of new skin breakdown  Description: 1.   Monitor for areas of redness and/or skin breakdown  2. Assess vascular access sites hourly  3. Every 4-6 hours minimum:  Change oxygen saturation probe site  4. Every 4-6 hours:  If on nasal continuous positive airway pressure, respiratory therapy assess nares and determine need for appliance change or resting period.   Outcome: Progressing     Problem: ABCDS Injury Assessment  Goal: Absence of physical injury  Outcome: Progressing
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
## 2022-11-11 NOTE — PLAN OF CARE
Problem: Discharge Planning  Goal: Discharge to home or other facility with appropriate resources  Outcome: Progressing  Flowsheets (Taken 11/11/2022 0800)  Discharge to home or other facility with appropriate resources:   Identify barriers to discharge with patient and caregiver   Arrange for needed discharge resources and transportation as appropriate   Identify discharge learning needs (meds, wound care, etc)   Refer to discharge planning if patient needs post-hospital services based on physician order or complex needs related to functional status, cognitive ability or social support system     Problem: Pain  Goal: Verbalizes/displays adequate comfort level or baseline comfort level  Outcome: Progressing  Flowsheets (Taken 11/11/2022 0800)  Verbalizes/displays adequate comfort level or baseline comfort level:   Encourage patient to monitor pain and request assistance   Assess pain using appropriate pain scale   Administer analgesics based on type and severity of pain and evaluate response   Implement non-pharmacological measures as appropriate and evaluate response   Notify Licensed Independent Practitioner if interventions unsuccessful or patient reports new pain   Consider cultural and social influences on pain and pain management     Problem: Cardiovascular - Adult  Goal: Maintains optimal cardiac output and hemodynamic stability  Outcome: Progressing  Flowsheets (Taken 11/11/2022 0800)  Maintains optimal cardiac output and hemodynamic stability:   Monitor blood pressure and heart rate   Monitor urine output and notify Licensed Independent Practitioner for values outside of normal range   Assess for signs of decreased cardiac output   Administer fluid and/or volume expanders as ordered   Administer vasoactive medications as ordered  Goal: Absence of cardiac dysrhythmias or at baseline  Outcome: Progressing  Flowsheets (Taken 11/11/2022 0800)  Absence of cardiac dysrhythmias or at baseline: Monitor cardiac rate and rhythm     Problem: Skin/Tissue Integrity - Adult  Goal: Skin integrity remains intact  Outcome: Progressing  Flowsheets (Taken 11/11/2022 0800)  Skin Integrity Remains Intact:   Monitor for areas of redness and/or skin breakdown   Every 4-6 hours minimum: Change oxygen saturation probe site  Goal: Incisions, wounds, or drain sites healing without S/S of infection  Outcome: Progressing  Flowsheets (Taken 11/11/2022 0800)  Incisions, Wounds, or Drain Sites Healing Without Sign and Symptoms of Infection:   ADMISSION and DAILY: Assess and document risk factors for pressure ulcer development   TWICE DAILY: Assess and document skin integrity   TWICE DAILY: Assess and document dressing/incision, wound bed, drain sites and surrounding tissue  Goal: Oral mucous membranes remain intact  Outcome: Progressing  Flowsheets (Taken 11/11/2022 0800)  Oral Mucous Membranes Remain Intact: Assess oral mucosa and hygiene practices     Problem: Musculoskeletal - Adult  Goal: Return mobility to safest level of function  Outcome: Progressing  Flowsheets (Taken 11/11/2022 0800)  Return Mobility to Safest Level of Function:   Assess patient stability and activity tolerance for standing, transferring and ambulating with or without assistive devices   Assist with transfers and ambulation using safe patient handling equipment as needed   Ensure adequate protection for wounds/incisions during mobilization  Goal: Maintain proper alignment of affected body part  Outcome: Progressing  Goal: Return ADL status to a safe level of function  Outcome: Progressing     Problem: Skin/Tissue Integrity  Goal: Absence of new skin breakdown  Description: 1. Monitor for areas of redness and/or skin breakdown  2. Assess vascular access sites hourly  3. Every 4-6 hours minimum:  Change oxygen saturation probe site  4.   Every 4-6 hours:  If on nasal continuous positive airway pressure, respiratory therapy assess nares and determine need for appliance change or resting period. Outcome: Progressing     Problem: ABCDS Injury Assessment  Goal: Absence of physical injury  Outcome: Progressing   Patient refused therapy. Encouraged patient to be up. Insulin gtt continues.
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
## 2022-11-11 NOTE — CARE COORDINATION
Call to [facility] to verify they have received referral, spoke with Tracy Toscano in admissions who relates she has not received referral, fax verified and referral resent    [id number] Call to [facility] to verify they received 2nd referral, spoke with Rodrigo Mccormick, admissions are in a meeting she will relay the message for a return call

## 2022-11-11 NOTE — PROGRESS NOTES
2106 Notified Dr. Elsy Pickering of low BP 89/61 (69). New orders received (500 mL bolus NS). 2155 Notified Dr. Elsy Pickering of pt being bradycardic in the 40's to 50's. EKG done. 2493 Notified Dr. Elsy Pickering of low BP 83/56 (66). New orders received (500 mL bolus LR, labs and CXR). 5545 Notified Dr. Elsy Pickering of rpt BP which was 96/70 (70). New orders received (500 mL bolus LR). 5839 Notified Dr. Elsy Pickering of rpt /65 (76). No new orders at this time.
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
## 2022-11-12 LAB
ABSOLUTE EOS #: 0.14 K/UL (ref 0–0.4)
ABSOLUTE IMMATURE GRANULOCYTE: 0.14 K/UL (ref 0–0.3)
ABSOLUTE LYMPH #: 1.14 K/UL (ref 1–4.8)
ABSOLUTE MONO #: 1.72 K/UL (ref 0.1–0.8)
ANION GAP SERPL CALCULATED.3IONS-SCNC: 6 MMOL/L (ref 9–17)
BASOPHILS # BLD: 0 % (ref 0–2)
BASOPHILS ABSOLUTE: 0 K/UL (ref 0–0.2)
BUN BLDV-MCNC: 22 MG/DL (ref 6–20)
CALCIUM SERPL-MCNC: 8 MG/DL (ref 8.6–10.4)
CHLORIDE BLD-SCNC: 106 MMOL/L (ref 98–107)
CO2: 21 MMOL/L (ref 20–31)
CREAT SERPL-MCNC: 0.95 MG/DL (ref 0.7–1.2)
EOSINOPHILS RELATIVE PERCENT: 1 % (ref 1–4)
GFR SERPL CREATININE-BSD FRML MDRD: >60 ML/MIN/1.73M2
GLUCOSE BLD-MCNC: 117 MG/DL (ref 75–110)
GLUCOSE BLD-MCNC: 118 MG/DL (ref 75–110)
GLUCOSE BLD-MCNC: 126 MG/DL (ref 75–110)
GLUCOSE BLD-MCNC: 127 MG/DL (ref 75–110)
GLUCOSE BLD-MCNC: 127 MG/DL (ref 75–110)
GLUCOSE BLD-MCNC: 128 MG/DL (ref 75–110)
GLUCOSE BLD-MCNC: 133 MG/DL (ref 75–110)
GLUCOSE BLD-MCNC: 135 MG/DL (ref 75–110)
GLUCOSE BLD-MCNC: 139 MG/DL (ref 75–110)
GLUCOSE BLD-MCNC: 145 MG/DL (ref 75–110)
GLUCOSE BLD-MCNC: 147 MG/DL (ref 75–110)
GLUCOSE BLD-MCNC: 149 MG/DL (ref 75–110)
GLUCOSE BLD-MCNC: 150 MG/DL (ref 75–110)
GLUCOSE BLD-MCNC: 151 MG/DL (ref 70–99)
GLUCOSE BLD-MCNC: 155 MG/DL (ref 75–110)
GLUCOSE BLD-MCNC: 163 MG/DL (ref 75–110)
GLUCOSE BLD-MCNC: 169 MG/DL (ref 75–110)
GLUCOSE BLD-MCNC: 176 MG/DL (ref 75–110)
GLUCOSE BLD-MCNC: 178 MG/DL (ref 75–110)
GLUCOSE BLD-MCNC: 179 MG/DL (ref 75–110)
GLUCOSE BLD-MCNC: 185 MG/DL (ref 75–110)
GLUCOSE BLD-MCNC: 187 MG/DL (ref 75–110)
GLUCOSE BLD-MCNC: 201 MG/DL (ref 75–110)
GLUCOSE BLD-MCNC: 215 MG/DL (ref 75–110)
HCT VFR BLD CALC: 30.8 % (ref 40.7–50.3)
HEMOGLOBIN: 9.7 G/DL (ref 13–17)
IMMATURE GRANULOCYTES: 1 %
LYMPHOCYTES # BLD: 8 % (ref 24–44)
MCH RBC QN AUTO: 27.6 PG (ref 25.2–33.5)
MCHC RBC AUTO-ENTMCNC: 31.5 G/DL (ref 28.4–34.8)
MCV RBC AUTO: 87.7 FL (ref 82.6–102.9)
MONOCYTES # BLD: 12 % (ref 1–7)
MORPHOLOGY: NORMAL
NRBC AUTOMATED: 0 PER 100 WBC
PDW BLD-RTO: 14.2 % (ref 11.8–14.4)
PLATELET # BLD: 236 K/UL (ref 138–453)
PMV BLD AUTO: 10.8 FL (ref 8.1–13.5)
POTASSIUM SERPL-SCNC: 4.2 MMOL/L (ref 3.7–5.3)
RBC # BLD: 3.51 M/UL (ref 4.21–5.77)
SEG NEUTROPHILS: 78 % (ref 36–66)
SEGMENTED NEUTROPHILS ABSOLUTE COUNT: 11.16 K/UL (ref 1.8–7.7)
SODIUM BLD-SCNC: 133 MMOL/L (ref 135–144)
WBC # BLD: 14.3 K/UL (ref 3.5–11.3)

## 2022-11-12 PROCEDURE — 82947 ASSAY GLUCOSE BLOOD QUANT: CPT

## 2022-11-12 PROCEDURE — 36415 COLL VENOUS BLD VENIPUNCTURE: CPT

## 2022-11-12 PROCEDURE — 2060000000 HC ICU INTERMEDIATE R&B

## 2022-11-12 PROCEDURE — 6360000002 HC RX W HCPCS

## 2022-11-12 PROCEDURE — 6370000000 HC RX 637 (ALT 250 FOR IP): Performed by: STUDENT IN AN ORGANIZED HEALTH CARE EDUCATION/TRAINING PROGRAM

## 2022-11-12 PROCEDURE — 2580000003 HC RX 258

## 2022-11-12 PROCEDURE — 6370000000 HC RX 637 (ALT 250 FOR IP): Performed by: NURSE PRACTITIONER

## 2022-11-12 PROCEDURE — 2580000003 HC RX 258: Performed by: NURSE PRACTITIONER

## 2022-11-12 PROCEDURE — 2500000003 HC RX 250 WO HCPCS

## 2022-11-12 PROCEDURE — 6370000000 HC RX 637 (ALT 250 FOR IP)

## 2022-11-12 PROCEDURE — 85025 COMPLETE CBC W/AUTO DIFF WBC: CPT

## 2022-11-12 PROCEDURE — 80048 BASIC METABOLIC PNL TOTAL CA: CPT

## 2022-11-12 RX ADMIN — ACETAMINOPHEN 1000 MG: 500 TABLET ORAL at 20:17

## 2022-11-12 RX ADMIN — GABAPENTIN 300 MG: 300 CAPSULE ORAL at 19:55

## 2022-11-12 RX ADMIN — GABAPENTIN 300 MG: 300 CAPSULE ORAL at 03:43

## 2022-11-12 RX ADMIN — METHOCARBAMOL TABLETS 750 MG: 750 TABLET, COATED ORAL at 19:55

## 2022-11-12 RX ADMIN — OXYCODONE 5 MG: 5 TABLET ORAL at 01:48

## 2022-11-12 RX ADMIN — SODIUM CHLORIDE, PRESERVATIVE FREE 10 ML: 5 INJECTION INTRAVENOUS at 19:59

## 2022-11-12 RX ADMIN — Medication 10 MG: at 15:48

## 2022-11-12 RX ADMIN — ACETAMINOPHEN 1000 MG: 500 TABLET ORAL at 14:48

## 2022-11-12 RX ADMIN — SODIUM CHLORIDE: 9 INJECTION, SOLUTION INTRAVENOUS at 10:45

## 2022-11-12 RX ADMIN — ENOXAPARIN SODIUM 30 MG: 100 INJECTION SUBCUTANEOUS at 09:54

## 2022-11-12 RX ADMIN — OXYCODONE 5 MG: 5 TABLET ORAL at 23:45

## 2022-11-12 RX ADMIN — Medication 10 MG: at 22:11

## 2022-11-12 RX ADMIN — POLYETHYLENE GLYCOL 3350 17 G: 17 POWDER, FOR SOLUTION ORAL at 09:54

## 2022-11-12 RX ADMIN — METHOCARBAMOL TABLETS 750 MG: 750 TABLET, COATED ORAL at 14:48

## 2022-11-12 RX ADMIN — ACETAMINOPHEN 1000 MG: 500 TABLET ORAL at 04:45

## 2022-11-12 RX ADMIN — METHOCARBAMOL TABLETS 750 MG: 750 TABLET, COATED ORAL at 08:34

## 2022-11-12 RX ADMIN — DESMOPRESSIN ACETATE 40 MG: 0.2 TABLET ORAL at 09:55

## 2022-11-12 RX ADMIN — OXYCODONE 5 MG: 5 TABLET ORAL at 19:56

## 2022-11-12 RX ADMIN — ENOXAPARIN SODIUM 30 MG: 100 INJECTION SUBCUTANEOUS at 19:56

## 2022-11-12 RX ADMIN — NAPROXEN 500 MG: 250 TABLET ORAL at 18:08

## 2022-11-12 RX ADMIN — FAMOTIDINE 20 MG: 20 TABLET, FILM COATED ORAL at 19:55

## 2022-11-12 RX ADMIN — FUROSEMIDE 20 MG: 20 TABLET ORAL at 09:54

## 2022-11-12 RX ADMIN — SODIUM CHLORIDE, PRESERVATIVE FREE 10 ML: 5 INJECTION INTRAVENOUS at 09:54

## 2022-11-12 RX ADMIN — NAPROXEN 500 MG: 250 TABLET ORAL at 08:35

## 2022-11-12 RX ADMIN — GABAPENTIN 300 MG: 300 CAPSULE ORAL at 12:05

## 2022-11-12 RX ADMIN — HYDRALAZINE HYDROCHLORIDE 10 MG: 20 INJECTION INTRAMUSCULAR; INTRAVENOUS at 12:52

## 2022-11-12 RX ADMIN — METHOCARBAMOL TABLETS 750 MG: 750 TABLET, COATED ORAL at 01:56

## 2022-11-12 RX ADMIN — HYDRALAZINE HYDROCHLORIDE 10 MG: 20 INJECTION INTRAMUSCULAR; INTRAVENOUS at 20:17

## 2022-11-12 RX ADMIN — FAMOTIDINE 20 MG: 20 TABLET, FILM COATED ORAL at 09:54

## 2022-11-12 ASSESSMENT — PAIN - FUNCTIONAL ASSESSMENT
PAIN_FUNCTIONAL_ASSESSMENT: PREVENTS OR INTERFERES SOME ACTIVE ACTIVITIES AND ADLS

## 2022-11-12 ASSESSMENT — PAIN SCALES - GENERAL
PAINLEVEL_OUTOF10: 7
PAINLEVEL_OUTOF10: 8
PAINLEVEL_OUTOF10: 8
PAINLEVEL_OUTOF10: 5
PAINLEVEL_OUTOF10: 8
PAINLEVEL_OUTOF10: 3
PAINLEVEL_OUTOF10: 5
PAINLEVEL_OUTOF10: 0
PAINLEVEL_OUTOF10: 7
PAINLEVEL_OUTOF10: 5

## 2022-11-12 ASSESSMENT — PAIN DESCRIPTION - ORIENTATION
ORIENTATION: LOWER
ORIENTATION: LEFT
ORIENTATION: RIGHT;LEFT
ORIENTATION: RIGHT;LEFT;ANTERIOR
ORIENTATION: RIGHT;LEFT;ANTERIOR
ORIENTATION: LEFT
ORIENTATION: RIGHT;LEFT

## 2022-11-12 ASSESSMENT — PAIN DESCRIPTION - LOCATION
LOCATION: GENERALIZED
LOCATION: ABDOMEN;HIP
LOCATION: HIP
LOCATION: HIP;ABDOMEN
LOCATION: HIP

## 2022-11-12 ASSESSMENT — PAIN DESCRIPTION - DESCRIPTORS
DESCRIPTORS: ACHING
DESCRIPTORS: ACHING;DISCOMFORT
DESCRIPTORS: ACHING;DISCOMFORT
DESCRIPTORS: ACHING
DESCRIPTORS: ACHING
DESCRIPTORS: SHARP
DESCRIPTORS: ACHING

## 2022-11-12 ASSESSMENT — PAIN DESCRIPTION - FREQUENCY: FREQUENCY: INTERMITTENT

## 2022-11-12 ASSESSMENT — PAIN DESCRIPTION - PAIN TYPE: TYPE: SURGICAL PAIN
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
## 2022-11-12 NOTE — PLAN OF CARE
Problem: Discharge Planning  Goal: Discharge to home or other facility with appropriate resources  11/12/2022 1656 by Italo Altamirano RN  Outcome: Progressing     Problem: Discharge Planning  Goal: Discharge to home or other facility with appropriate resources  11/12/2022 1656 by Italo Altamirano RN  Outcome: Progressing  11/12/2022 0614 by Kasey Rosales RN  Outcome: Progressing     Problem: Pain  Goal: Verbalizes/displays adequate comfort level or baseline comfort level  11/12/2022 1656 by Italo Altamirano RN  Outcome: Progressing  11/12/2022 0614 by Kasey Rosales RN  Outcome: Progressing     Problem: Cardiovascular - Adult  Goal: Maintains optimal cardiac output and hemodynamic stability  11/12/2022 1656 by Italo Altamirano RN  Outcome: Progressing  11/12/2022 0614 by Kasey Rosales RN  Outcome: Progressing  Goal: Absence of cardiac dysrhythmias or at baseline  11/12/2022 1656 by Italo Altamirano RN  Outcome: Progressing  11/12/2022 0614 by Kasey Rosales RN  Outcome: Progressing     Problem: Skin/Tissue Integrity - Adult  Goal: Skin integrity remains intact  11/12/2022 1656 by Italo Altamirano RN  Outcome: Progressing  11/12/2022 0614 by Kasey Rosales RN  Outcome: Progressing  Flowsheets (Taken 11/11/2022 2000)  Skin Integrity Remains Intact: Monitor for areas of redness and/or skin breakdown  Goal: Incisions, wounds, or drain sites healing without S/S of infection  11/12/2022 1656 by Italo Altamirano RN  Outcome: Progressing  11/12/2022 0614 by Kasey Rosales RN  Outcome: Progressing  Flowsheets (Taken 11/11/2022 2000)  Incisions, Wounds, or Drain Sites Healing Without Sign and Symptoms of Infection: TWICE DAILY: Assess and document skin integrity  Goal: Oral mucous membranes remain intact  11/12/2022 1656 by Italo Altamirano RN  Outcome: Progressing  11/12/2022 0614 by Kasey Rosales RN  Outcome: Progressing  Flowsheets (Taken 11/11/2022 2000)  Oral Mucous Membranes Remain Intact: Assess oral mucosa and hygiene practices     Problem: Musculoskeletal - Adult  Goal: Return mobility to safest level of function  11/12/2022 1656 by Italo Altamirano RN  Outcome: Progressing  11/12/2022 0614 by Kasey Rosales RN  Outcome: Progressing  Goal: Maintain proper alignment of affected body part  11/12/2022 1656 by Italo Altamirano RN  Outcome: Progressing  11/12/2022 0614 by Kasey Rosales RN  Outcome: Progressing  Goal: Return ADL status to a safe level of function  11/12/2022 1656 by Italo Altamirano RN  Outcome: Progressing  11/12/2022 0614 by Kasey Rosales RN  Outcome: Progressing     Problem: Skin/Tissue Integrity  Goal: Absence of new skin breakdown  Description: 1. Monitor for areas of redness and/or skin breakdown  2. Assess vascular access sites hourly  3. Every 4-6 hours minimum:  Change oxygen saturation probe site  4. Every 4-6 hours:  If on nasal continuous positive airway pressure, respiratory therapy assess nares and determine need for appliance change or resting period.   11/12/2022 1656 by Italo Altamirano RN  Outcome: Progressing  11/12/2022 0614 by Kasey Rosales RN  Outcome: Progressing     Problem: ABCDS Injury Assessment  Goal: Absence of physical injury  11/12/2022 1656 by Italo Altamirano RN  Outcome: Progressing  11/12/2022 0614 by Kasey Rosales RN  Outcome: Progressing     Problem: Safety - Adult  Goal: Free from fall injury  11/12/2022 1656 by Italo Altamirano RN  Outcome: Progressing  11/12/2022 0614 by Kasey Rosales RN  Outcome: Progressing

## 2022-11-12 NOTE — PLAN OF CARE
Problem: Discharge Planning  Goal: Discharge to home or other facility with appropriate resources  11/12/2022 0614 by Aspen Kc RN  Outcome: Progressing  11/11/2022 1846 by Compa Ames RN  Outcome: Progressing  Flowsheets (Taken 11/11/2022 0800)  Discharge to home or other facility with appropriate resources:   Identify barriers to discharge with patient and caregiver   Arrange for needed discharge resources and transportation as appropriate   Identify discharge learning needs (meds, wound care, etc)   Refer to discharge planning if patient needs post-hospital services based on physician order or complex needs related to functional status, cognitive ability or social support system     Problem: Pain  Goal: Verbalizes/displays adequate comfort level or baseline comfort level  11/12/2022 0614 by Aspen Kc RN  Outcome: Progressing  11/11/2022 1846 by Compa Ames RN  Outcome: Progressing  Flowsheets (Taken 11/11/2022 0800)  Verbalizes/displays adequate comfort level or baseline comfort level:   Encourage patient to monitor pain and request assistance   Assess pain using appropriate pain scale   Administer analgesics based on type and severity of pain and evaluate response   Implement non-pharmacological measures as appropriate and evaluate response   Notify Licensed Independent Practitioner if interventions unsuccessful or patient reports new pain   Consider cultural and social influences on pain and pain management     Problem: Cardiovascular - Adult  Goal: Maintains optimal cardiac output and hemodynamic stability  11/12/2022 0614 by Aspen Kc RN  Outcome: Progressing  11/11/2022 1846 by Compa Ames RN  Outcome: Progressing  Flowsheets (Taken 11/11/2022 0800)  Maintains optimal cardiac output and hemodynamic stability:   Monitor blood pressure and heart rate   Monitor urine output and notify Licensed Independent Practitioner for values outside of normal range   Assess for signs of decreased cardiac output   Administer fluid and/or volume expanders as ordered   Administer vasoactive medications as ordered  Goal: Absence of cardiac dysrhythmias or at baseline  11/12/2022 0614 by Erika Ruiz RN  Outcome: Progressing  11/11/2022 1846 by Krista Darby RN  Outcome: Progressing  Flowsheets (Taken 11/11/2022 0800)  Absence of cardiac dysrhythmias or at baseline: Monitor cardiac rate and rhythm     Problem: Skin/Tissue Integrity - Adult  Goal: Skin integrity remains intact  11/12/2022 0614 by Erika Ruiz RN  Outcome: Progressing  Flowsheets (Taken 11/11/2022 2000)  Skin Integrity Remains Intact: Monitor for areas of redness and/or skin breakdown  11/11/2022 1846 by Krista Darby RN  Outcome: Progressing  Flowsheets (Taken 11/11/2022 0800)  Skin Integrity Remains Intact:   Monitor for areas of redness and/or skin breakdown   Every 4-6 hours minimum: Change oxygen saturation probe site  Goal: Incisions, wounds, or drain sites healing without S/S of infection  11/12/2022 0614 by Erika Ruiz RN  Outcome: Progressing  Flowsheets (Taken 11/11/2022 2000)  Incisions, Wounds, or Drain Sites Healing Without Sign and Symptoms of Infection: TWICE DAILY: Assess and document skin integrity  11/11/2022 1846 by Krista Darby RN  Outcome: Progressing  Flowsheets (Taken 11/11/2022 0800)  Incisions, Wounds, or Drain Sites Healing Without Sign and Symptoms of Infection:   ADMISSION and DAILY: Assess and document risk factors for pressure ulcer development   TWICE DAILY: Assess and document skin integrity   TWICE DAILY: Assess and document dressing/incision, wound bed, drain sites and surrounding tissue  Goal: Oral mucous membranes remain intact  11/12/2022 0614 by Erika Ruiz RN  Outcome: Progressing  Flowsheets (Taken 11/11/2022 2000)  Oral Mucous Membranes Remain Intact: Assess oral mucosa and hygiene practices  11/11/2022 1846 by Krista Darby RN  Outcome: Progressing  Flowsheets (Taken 11/11/2022 0800)  Oral Mucous Membranes Remain Intact: Assess oral mucosa and hygiene practices     Problem: Musculoskeletal - Adult  Goal: Return mobility to safest level of function  11/12/2022 0614 by Krysten Bagley RN  Outcome: Progressing  11/11/2022 1846 by Giuseppe Zabala RN  Outcome: Progressing  Flowsheets (Taken 11/11/2022 0800)  Return Mobility to Safest Level of Function:   Assess patient stability and activity tolerance for standing, transferring and ambulating with or without assistive devices   Assist with transfers and ambulation using safe patient handling equipment as needed   Ensure adequate protection for wounds/incisions during mobilization  Goal: Maintain proper alignment of affected body part  11/12/2022 0614 by Krysten Bagley RN  Outcome: Progressing  11/11/2022 1846 by Giuseppe Zabala RN  Outcome: Progressing  Goal: Return ADL status to a safe level of function  11/12/2022 0614 by Krysten Bagley RN  Outcome: Progressing  11/11/2022 1846 by Giuseppe Zabala RN  Outcome: Progressing     Problem: Skin/Tissue Integrity  Goal: Absence of new skin breakdown  Description: 1. Monitor for areas of redness and/or skin breakdown  2. Assess vascular access sites hourly  3. Every 4-6 hours minimum:  Change oxygen saturation probe site  4. Every 4-6 hours:  If on nasal continuous positive airway pressure, respiratory therapy assess nares and determine need for appliance change or resting period.   11/12/2022 0614 by Krysten Bagley RN  Outcome: Progressing  11/11/2022 1846 by Giuseppe Zabala RN  Outcome: Progressing     Problem: ABCDS Injury Assessment  Goal: Absence of physical injury  11/12/2022 0614 by Krysten Bagley RN  Outcome: Progressing  11/11/2022 1846 by Giuseppe Zabala RN  Outcome: Progressing     Problem: Safety - Adult  Goal: Free from fall injury  Outcome: Progressing

## 2022-11-13 LAB
ABSOLUTE EOS #: 0.11 K/UL (ref 0–0.4)
ABSOLUTE IMMATURE GRANULOCYTE: 0 K/UL (ref 0–0.3)
ABSOLUTE LYMPH #: 1.22 K/UL (ref 1–4.8)
ABSOLUTE MONO #: 1.33 K/UL (ref 0.1–0.8)
ANION GAP SERPL CALCULATED.3IONS-SCNC: 10 MMOL/L (ref 9–17)
BASOPHILS # BLD: 0 % (ref 0–2)
BASOPHILS ABSOLUTE: 0 K/UL (ref 0–0.2)
BUN BLDV-MCNC: 16 MG/DL (ref 6–20)
CALCIUM SERPL-MCNC: 8.3 MG/DL (ref 8.6–10.4)
CHLORIDE BLD-SCNC: 104 MMOL/L (ref 98–107)
CO2: 24 MMOL/L (ref 20–31)
CREAT SERPL-MCNC: 0.7 MG/DL (ref 0.7–1.2)
EOSINOPHILS RELATIVE PERCENT: 1 % (ref 1–4)
GFR SERPL CREATININE-BSD FRML MDRD: >60 ML/MIN/1.73M2
GLUCOSE BLD-MCNC: 100 MG/DL (ref 75–110)
GLUCOSE BLD-MCNC: 118 MG/DL (ref 75–110)
GLUCOSE BLD-MCNC: 119 MG/DL (ref 75–110)
GLUCOSE BLD-MCNC: 120 MG/DL (ref 75–110)
GLUCOSE BLD-MCNC: 120 MG/DL (ref 75–110)
GLUCOSE BLD-MCNC: 126 MG/DL (ref 75–110)
GLUCOSE BLD-MCNC: 130 MG/DL (ref 75–110)
GLUCOSE BLD-MCNC: 130 MG/DL (ref 75–110)
GLUCOSE BLD-MCNC: 131 MG/DL (ref 75–110)
GLUCOSE BLD-MCNC: 133 MG/DL (ref 75–110)
GLUCOSE BLD-MCNC: 134 MG/DL (ref 70–99)
GLUCOSE BLD-MCNC: 135 MG/DL (ref 75–110)
GLUCOSE BLD-MCNC: 138 MG/DL (ref 75–110)
GLUCOSE BLD-MCNC: 145 MG/DL (ref 75–110)
GLUCOSE BLD-MCNC: 148 MG/DL (ref 75–110)
GLUCOSE BLD-MCNC: 151 MG/DL (ref 75–110)
GLUCOSE BLD-MCNC: 163 MG/DL (ref 75–110)
GLUCOSE BLD-MCNC: 170 MG/DL (ref 75–110)
GLUCOSE BLD-MCNC: 189 MG/DL (ref 75–110)
GLUCOSE BLD-MCNC: 195 MG/DL (ref 75–110)
GLUCOSE BLD-MCNC: 201 MG/DL (ref 75–110)
GLUCOSE BLD-MCNC: 203 MG/DL (ref 75–110)
GLUCOSE BLD-MCNC: 221 MG/DL (ref 75–110)
GLUCOSE BLD-MCNC: 229 MG/DL (ref 75–110)
GLUCOSE BLD-MCNC: 242 MG/DL (ref 75–110)
HCT VFR BLD CALC: 32.7 % (ref 40.7–50.3)
HEMOGLOBIN: 9.9 G/DL (ref 13–17)
IMMATURE GRANULOCYTES: 0 %
LYMPHOCYTES # BLD: 11 % (ref 24–44)
MCH RBC QN AUTO: 27.3 PG (ref 25.2–33.5)
MCHC RBC AUTO-ENTMCNC: 30.3 G/DL (ref 28.4–34.8)
MCV RBC AUTO: 90.1 FL (ref 82.6–102.9)
MONOCYTES # BLD: 12 % (ref 1–7)
MORPHOLOGY: ABNORMAL
NRBC AUTOMATED: 0 PER 100 WBC
PDW BLD-RTO: 14 % (ref 11.8–14.4)
PLATELET # BLD: 310 K/UL (ref 138–453)
PMV BLD AUTO: 10.4 FL (ref 8.1–13.5)
POTASSIUM SERPL-SCNC: 3.9 MMOL/L (ref 3.7–5.3)
RBC # BLD: 3.63 M/UL (ref 4.21–5.77)
SEG NEUTROPHILS: 76 % (ref 36–66)
SEGMENTED NEUTROPHILS ABSOLUTE COUNT: 8.44 K/UL (ref 1.8–7.7)
SODIUM BLD-SCNC: 138 MMOL/L (ref 135–144)
WBC # BLD: 11.1 K/UL (ref 3.5–11.3)

## 2022-11-13 PROCEDURE — 36415 COLL VENOUS BLD VENIPUNCTURE: CPT

## 2022-11-13 PROCEDURE — 82947 ASSAY GLUCOSE BLOOD QUANT: CPT

## 2022-11-13 PROCEDURE — 6370000000 HC RX 637 (ALT 250 FOR IP)

## 2022-11-13 PROCEDURE — 6370000000 HC RX 637 (ALT 250 FOR IP): Performed by: SURGERY

## 2022-11-13 PROCEDURE — 2580000003 HC RX 258

## 2022-11-13 PROCEDURE — 6370000000 HC RX 637 (ALT 250 FOR IP): Performed by: STUDENT IN AN ORGANIZED HEALTH CARE EDUCATION/TRAINING PROGRAM

## 2022-11-13 PROCEDURE — 2060000000 HC ICU INTERMEDIATE R&B

## 2022-11-13 PROCEDURE — 6370000000 HC RX 637 (ALT 250 FOR IP): Performed by: NURSE PRACTITIONER

## 2022-11-13 PROCEDURE — 80048 BASIC METABOLIC PNL TOTAL CA: CPT

## 2022-11-13 PROCEDURE — 85025 COMPLETE CBC W/AUTO DIFF WBC: CPT

## 2022-11-13 PROCEDURE — 6360000002 HC RX W HCPCS

## 2022-11-13 RX ORDER — CARVEDILOL 25 MG/1
25 TABLET ORAL 2 TIMES DAILY WITH MEALS
Status: DISCONTINUED | OUTPATIENT
Start: 2022-11-13 | End: 2022-11-17

## 2022-11-13 RX ADMIN — ACETAMINOPHEN 1000 MG: 500 TABLET ORAL at 14:12

## 2022-11-13 RX ADMIN — SODIUM CHLORIDE, PRESERVATIVE FREE 10 ML: 5 INJECTION INTRAVENOUS at 10:11

## 2022-11-13 RX ADMIN — CARVEDILOL 25 MG: 25 TABLET, FILM COATED ORAL at 10:11

## 2022-11-13 RX ADMIN — FAMOTIDINE 20 MG: 20 TABLET, FILM COATED ORAL at 20:14

## 2022-11-13 RX ADMIN — OXYCODONE 5 MG: 5 TABLET ORAL at 14:12

## 2022-11-13 RX ADMIN — NAPROXEN 500 MG: 250 TABLET ORAL at 07:56

## 2022-11-13 RX ADMIN — GABAPENTIN 300 MG: 300 CAPSULE ORAL at 04:54

## 2022-11-13 RX ADMIN — NAPROXEN 500 MG: 250 TABLET ORAL at 17:28

## 2022-11-13 RX ADMIN — SODIUM CHLORIDE 7.28 UNITS/HR: 9 INJECTION, SOLUTION INTRAVENOUS at 14:14

## 2022-11-13 RX ADMIN — METHOCARBAMOL TABLETS 750 MG: 750 TABLET, COATED ORAL at 07:57

## 2022-11-13 RX ADMIN — GABAPENTIN 300 MG: 300 CAPSULE ORAL at 12:06

## 2022-11-13 RX ADMIN — FAMOTIDINE 20 MG: 20 TABLET, FILM COATED ORAL at 09:33

## 2022-11-13 RX ADMIN — HYDRALAZINE HYDROCHLORIDE 10 MG: 20 INJECTION INTRAMUSCULAR; INTRAVENOUS at 10:50

## 2022-11-13 RX ADMIN — METHOCARBAMOL TABLETS 750 MG: 750 TABLET, COATED ORAL at 14:12

## 2022-11-13 RX ADMIN — CARVEDILOL 25 MG: 25 TABLET, FILM COATED ORAL at 20:14

## 2022-11-13 RX ADMIN — ACETAMINOPHEN 1000 MG: 500 TABLET ORAL at 20:14

## 2022-11-13 RX ADMIN — ENOXAPARIN SODIUM 30 MG: 100 INJECTION SUBCUTANEOUS at 10:11

## 2022-11-13 RX ADMIN — DESMOPRESSIN ACETATE 40 MG: 0.2 TABLET ORAL at 09:33

## 2022-11-13 RX ADMIN — ENOXAPARIN SODIUM 30 MG: 100 INJECTION SUBCUTANEOUS at 20:14

## 2022-11-13 RX ADMIN — METHOCARBAMOL TABLETS 750 MG: 750 TABLET, COATED ORAL at 20:14

## 2022-11-13 RX ADMIN — METHOCARBAMOL TABLETS 750 MG: 750 TABLET, COATED ORAL at 02:05

## 2022-11-13 RX ADMIN — ACETAMINOPHEN 1000 MG: 500 TABLET ORAL at 04:54

## 2022-11-13 RX ADMIN — GABAPENTIN 300 MG: 300 CAPSULE ORAL at 20:14

## 2022-11-13 RX ADMIN — FUROSEMIDE 20 MG: 20 TABLET ORAL at 09:33

## 2022-11-13 RX ADMIN — SODIUM CHLORIDE, PRESERVATIVE FREE 10 ML: 5 INJECTION INTRAVENOUS at 20:15

## 2022-11-13 RX ADMIN — POLYETHYLENE GLYCOL 3350 17 G: 17 POWDER, FOR SOLUTION ORAL at 09:33

## 2022-11-13 ASSESSMENT — PAIN DESCRIPTION - DESCRIPTORS
DESCRIPTORS: ACHING
DESCRIPTORS: DISCOMFORT
DESCRIPTORS: ACHING
DESCRIPTORS: DISCOMFORT

## 2022-11-13 ASSESSMENT — PAIN DESCRIPTION - FREQUENCY: FREQUENCY: INTERMITTENT

## 2022-11-13 ASSESSMENT — PAIN SCALES - GENERAL
PAINLEVEL_OUTOF10: 7
PAINLEVEL_OUTOF10: 0
PAINLEVEL_OUTOF10: 7
PAINLEVEL_OUTOF10: 4
PAINLEVEL_OUTOF10: 8

## 2022-11-13 ASSESSMENT — PAIN - FUNCTIONAL ASSESSMENT
PAIN_FUNCTIONAL_ASSESSMENT: PREVENTS OR INTERFERES SOME ACTIVE ACTIVITIES AND ADLS

## 2022-11-13 ASSESSMENT — PAIN DESCRIPTION - LOCATION
LOCATION: HIP
LOCATION: HIP
LOCATION: ABDOMEN;HIP;LEG
LOCATION: PELVIS

## 2022-11-13 ASSESSMENT — PAIN DESCRIPTION - ORIENTATION
ORIENTATION: ANTERIOR
ORIENTATION: ANTERIOR;LEFT
ORIENTATION: RIGHT;LEFT;ANTERIOR
ORIENTATION: ANTERIOR;LEFT

## 2022-11-13 NOTE — PROGRESS NOTES
PROGRESS NOTE      PATIENT NAME: Amparo Waddell  MEDICAL RECORD NO. [id number]  DATE: 2022  SURGEON: Mert Meadows  PRIMARY CARE PHYSICIAN: No primary care provider on file. HD: # 4    ASSESSMENT    Patient Active Problem List   Diagnosis    Multiple closed fractures of pelvis, initial encounter ([facility] [address]    Hypertension    Hyperglycemia    Acetabulum fracture, left (HCC)    Pelvic hematoma in male       MEDICAL DECISION MAKING AND PLAN    Hx HTN, HLD, CAD, non ischemic cardiomyopathy, DM, CKD, morbid obesity, methamphetamine abuse     Acute renal injury              Creatinine 0.3 admission->0.95 today     Relative hypotension              Echo                           Ef 35-40                          Normal RA pressures              Ekg 11/10                          NSR              Troponin 22->42->53                          In setting ofAKI              Cardio has signed off                    L inferior and superior pubic rami fxs, L anterior and posterior acetabular wall fxs, L iliac wing fx pelvic hematoma              ORIF left acetabulum and pelvis                           drains x2                                     Activity/ weight bearing status: WBAT RLE, TTWB LLE              PT/OT ordered              MRI L knee 11/10 - chronic changes        DM2 (A1c 8.6)                          Insulin gtt                                      152 units in 24h                          IM consult for DM mgmt    PLAN:      SUBJECTIVE    Amparo Waddell is has slightly improved since yesterday. Doing well. Insulin gtt - does not usually take home DM meds.         OBJECTIVE  VITALS: Temp: Temp: 98.1 °F (36.7 °C)Temp  Av.3 °F (36.8 °C)  Min: 98.1 °F (36.7 °C)  Max: 99 °F (25.8 °C) BP Systolic (54ZXP), OOQ:310 , Min:140 , CCS:628   Diastolic (28VVN), CYZ:77, Min:68, Max:108   Pulse Pulse  Av  Min: 85  Max: 103 Resp Resp  Av.2  Min: 12  Max: 18 Pulse ox SpO2  Av.4 %  Min: 94 %  Max: 99 %    Constitutional:       Appearance: He is obese. HENT:      Head: Normocephalic. Mouth/Throat:      Mouth: Mucous membranes are moist.   Eyes:      Extraocular Movements: Extraocular movements intact. Abdominal:      Palpations: Abdomen is soft. Comments: Wound covered and clean   Skin:     Capillary Refill: Capillary refill takes less than 2 seconds. Neurological:      General: No focal deficit present. Mental Status: He is alert. I/O last 3 completed shifts: In: 1210 [P.O.:1200; I.V.:10]  Out: 2110 [Urine:1850; Drains:260]    Drain/tube output: In: 1210 [P.O.:1200; I.V.:10]  Out: 1210 [Urine:950; Drains:260]    LAB:  CBC:   Recent Labs     11/10/22  0258 11/11/22  0009 11/11/22  0400 11/12/22  0519   WBC 17.1*  --  14.7* 14.3*   HGB 12.6* 9.9* 9.8* 9.7*   HCT 40.0* 30.9* 32.5* 30.8*   MCV 86.2  --  91.8 87.7     --  212 236     BMP:   Recent Labs     11/11/22  0400 11/11/22  1725 11/12/22  0519   * 133* 133*   K 4.4 4.3 4.2    102 106   CO2 19* 23 21   BUN 26* 25* 22*   CREATININE 1.53* 1.23* 0.95   GLUCOSE 147* 157* 151*     COAGS: No results for input(s): APTT, PROT, INR in the last 72 hours.     William Neff MD  11/12/22, 7:23 PM

## 2022-11-13 NOTE — PLAN OF CARE
Problem: Discharge Planning  Goal: Discharge to home or other facility with appropriate resources  11/13/2022 0441 by Mindy Parrish RN  Outcome: Progressing  11/12/2022 1656 by Maggie Morton RN  Outcome: Progressing     Problem: Pain  Goal: Verbalizes/displays adequate comfort level or baseline comfort level  11/13/2022 0441 by Mindy Parrish RN  Outcome: Progressing  11/12/2022 1656 by Maggie Morton RN  Outcome: Progressing     Problem: Cardiovascular - Adult  Goal: Maintains optimal cardiac output and hemodynamic stability  11/13/2022 0441 by Mindy Parrish RN  Outcome: Progressing  11/12/2022 1656 by Maggie Morton RN  Outcome: Progressing  Goal: Absence of cardiac dysrhythmias or at baseline  11/13/2022 0441 by Mindy Parrish RN  Outcome: Progressing  11/12/2022 1656 by Maggie Morton RN  Outcome: Progressing     Problem: Skin/Tissue Integrity - Adult  Goal: Skin integrity remains intact  11/13/2022 0441 by Mindy Parrish RN  Outcome: Progressing  Flowsheets (Taken 11/12/2022 2000)  Skin Integrity Remains Intact: Monitor for areas of redness and/or skin breakdown  11/12/2022 1656 by Maggie Morton RN  Outcome: Progressing  Goal: Incisions, wounds, or drain sites healing without S/S of infection  11/13/2022 0441 by Mindy Parrish RN  Outcome: Progressing  Flowsheets (Taken 11/12/2022 2000)  Incisions, Wounds, or Drain Sites Healing Without Sign and Symptoms of Infection: TWICE DAILY: Assess and document skin integrity  11/12/2022 1656 by Maggie Morton RN  Outcome: Progressing  Goal: Oral mucous membranes remain intact  11/13/2022 0441 by Mindy Parrish RN  Outcome: Progressing  Flowsheets (Taken 11/12/2022 2000)  Oral Mucous Membranes Remain Intact: Assess oral mucosa and hygiene practices  11/12/2022 1656 by Maggie Morton RN  Outcome: Progressing     Problem: Musculoskeletal - Adult  Goal: Return mobility to safest level of function  11/13/2022 0441 by [address] Raymond Cordova RN  Outcome: Progressing  11/12/2022 1656 by Radha Elizabeth RN  Outcome: Progressing  Goal: Maintain proper alignment of affected body part  11/13/2022 0441 by Carol Alcazar RN  Outcome: Progressing  11/12/2022 1656 by Radha Elizabeth RN  Outcome: Progressing  Goal: Return ADL status to a safe level of function  11/13/2022 0441 by Carol Alcazar RN  Outcome: Progressing  11/12/2022 1656 by Radha Elizabeth RN  Outcome: Progressing     Problem: Skin/Tissue Integrity  Goal: Absence of new skin breakdown  Description: 1. Monitor for areas of redness and/or skin breakdown  2. Assess vascular access sites hourly  3. Every 4-6 hours minimum:  Change oxygen saturation probe site  4. Every 4-6 hours:  If on nasal continuous positive airway pressure, respiratory therapy assess nares and determine need for appliance change or resting period.   11/13/2022 0441 by Carol Alcazar RN  Outcome: Progressing  11/12/2022 1656 by Radha Elizabeth RN  Outcome: Progressing     Problem: ABCDS Injury Assessment  Goal: Absence of physical injury  11/13/2022 0441 by Carol Alcazar RN  Outcome: Progressing  Flowsheets (Taken 11/12/2022 2000)  Absence of Physical Injury: Implement safety measures based on patient assessment  11/12/2022 1656 by Radha Elizabeth RN  Outcome: Progressing     Problem: Safety - Adult  Goal: Free from fall injury  11/13/2022 0441 by Carol Alcazar RN  Outcome: Karissa Cart (Taken 11/12/2022 2000)  Free From Fall Injury: Instruct family/caregiver on patient safety  11/12/2022 1656 by Radha Elizabeth RN  Outcome: Progressing

## 2022-11-13 NOTE — PROGRESS NOTES
PROGRESS NOTE    PATIENT NAME: Marita Kingsley  MEDICAL RECORD NO. [id number]  DATE: 2022  SURGEON: Alex Iglesias  PRIMARY CARE PHYSICIAN: No primary care provider on file. HD: # 5    ASSESSMENT    Patient Active Problem List   Diagnosis    Multiple closed fractures of pelvis, initial encounter ([facility] [address]    Hypertension    Hyperglycemia    Acetabulum fracture, left (HCC)    Pelvic hematoma in male       MEDICAL DECISION MAKING AND PLAN    Acute renal injury, resolved              Creatinine 0.3 admission->0. 95. Now 0.7    Monitor UOP, DC IVF                     L inferior and superior pubic rami fxs, L anterior and posterior acetabular wall fxs, L iliac wing fx pelvic hematoma              ORIF left acetabulum and pelvis                           drains x2                                     Activity/ weight bearing status: WBAT RLE, TTWB LLE              PT/OT               MRI L knee 11/10 - chronic changes        DM2 (A1c 8.6)                          Insulin gtt, continue                           IM consult for DM mgmt    HTN    Coreg restarted. Will begin to reintroduce home BP meds     Hx HTN, HLD, CAD, non ischemic cardiomyopathy, DM, CKD, morbid obesity, methamphetamine abuse    SUBJECTIVE    Marita Kingsley is has slightly improved since yesterday. Sitting in chair. Feels fatigued. Denies nausea or emesis. Tolerating diet. Coreg restarted today, will slowly reintroduce home BP meds. OBJECTIVE  VITALS: Temp: Temp: 97.5 °F (36.4 °C)Temp  Av.4 °F (36.9 °C)  Min: 97.5 °F (36.4 °C)  Max: 99.2 °F (99.8 °C) BP Systolic (14QHT), EFM:182 , Min:140 , EPK:874   Diastolic (58DPI), QUD:85, Min:82, Max:112   Pulse Pulse  Av.8  Min: 90  Max: 107 Resp Resp  Avg: 15.7  Min: 10  Max: 20 Pulse ox SpO2  Av.3 %  Min: 97 %  Max: 99 %    Constitutional:       Appearance: He is obese. HENT:      Head: Normocephalic.       Mouth/Throat:      Mouth: Mucous membranes are moist.   Eyes:      Extraocular Movements: Extraocular movements intact. Abdominal:      Palpations: Abdomen is softly distended, no guarding or peritoneal signs   Skin:     Capillary Refill: Capillary refill takes less than 2 seconds. Neurological:      General: No focal deficit present. Mental Status: He is alert. I/O last 3 completed shifts: In: 1210 [P.O.:1200; I.V.:10]  Out: 1310 [Urine:950; Drains:360]    Drain/tube output: In: 4865 [P.O.:1800; I.V.:10]  Out: 610 [Urine:450; Drains:160]    LAB:  CBC:   Recent Labs     11/11/22  0400 11/12/22  0519 11/13/22  0520   WBC 14.7* 14.3* 11.1   HGB 9.8* 9.7* 9.9*   HCT 32.5* 30.8* 32.7*   MCV 91.8 87.7 90.1    236 310       BMP:   Recent Labs     11/11/22  1725 11/12/22  0519 11/13/22  0520   * 133* 138   K 4.3 4.2 3.9    106 104   CO2 23 21 24   BUN 25* 22* 16   CREATININE 1.23* 0.95 0.70   GLUCOSE 157* 151* 134*       COAGS: No results for input(s): APTT, PROT, INR in the last 72 hours.       [place]iera [place] DO
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
## 2022-11-13 NOTE — PLAN OF CARE
Problem: Discharge Planning  Goal: Discharge to home or other facility with appropriate resources  11/13/2022 0441 by Ingrid Landeros RN  Outcome: Progressing     Problem: Pain  Goal: Verbalizes/displays adequate comfort level or baseline comfort level  11/13/2022 1651 by Chetan Cook RN  Outcome: Progressing  11/13/2022 0441 by Ingrid Landeros RN  Outcome: Progressing     Problem: Cardiovascular - Adult  Goal: Maintains optimal cardiac output and hemodynamic stability  11/13/2022 1651 by Chetan Cook RN  Outcome: Progressing  11/13/2022 0441 by Ingrid Landeros RN  Outcome: Progressing  Goal: Absence of cardiac dysrhythmias or at baseline  11/13/2022 0441 by Ingrid Landeros RN  Outcome: Progressing     Problem: Skin/Tissue Integrity - Adult  Goal: Skin integrity remains intact  11/13/2022 0441 by Ingrid Landeros RN  Outcome: Progressing  Flowsheets (Taken 11/12/2022 2000)  Skin Integrity Remains Intact: Monitor for areas of redness and/or skin breakdown  Goal: Incisions, wounds, or drain sites healing without S/S of infection  11/13/2022 0441 by Ingrid Landeros RN  Outcome: Progressing  Flowsheets (Taken 11/12/2022 2000)  Incisions, Wounds, or Drain Sites Healing Without Sign and Symptoms of Infection: TWICE DAILY: Assess and document skin integrity  Goal: Oral mucous membranes remain intact  11/13/2022 0441 by Ingrid Landeros RN  Outcome: Progressing  Flowsheets (Taken 11/12/2022 2000)  Oral Mucous Membranes Remain Intact: Assess oral mucosa and hygiene practices     Problem: Musculoskeletal - Adult  Goal: Return mobility to safest level of function  11/13/2022 1651 by Chetan Cook RN  Outcome: Progressing  11/13/2022 0441 by Ingrid Landeros RN  Outcome: Progressing  Goal: Maintain proper alignment of affected body part  11/13/2022 1651 by Chetan Cook RN  Outcome: Progressing  11/13/2022 0441 by Ingrid Landeros RN  Outcome: Progressing  Goal: Return ADL status to a safe level of function  11/13/2022 1651 by Terrance Diaz RN  Outcome: Progressing  11/13/2022 0441 by Vinay Brown RN  Outcome: Progressing     Problem: Skin/Tissue Integrity  Goal: Absence of new skin breakdown  Description: 1. Monitor for areas of redness and/or skin breakdown  2. Assess vascular access sites hourly  3. Every 4-6 hours minimum:  Change oxygen saturation probe site  4. Every 4-6 hours:  If on nasal continuous positive airway pressure, respiratory therapy assess nares and determine need for appliance change or resting period.   11/13/2022 1651 by Terrance Diaz RN  Outcome: Progressing  11/13/2022 0441 by Vinay Brown RN  Outcome: Progressing     Problem: ABCDS Injury Assessment  Goal: Absence of physical injury  11/13/2022 1651 by Terrance Diaz RN  Outcome: Progressing  11/13/2022 0441 by Vinay Brown RN  Outcome: Progressing  Flowsheets (Taken 11/12/2022 2000)  Absence of Physical Injury: Implement safety measures based on patient assessment     Problem: Safety - Adult  Goal: Free from fall injury  11/13/2022 1651 by Terrance Diaz RN  Outcome: Progressing  11/13/2022 0441 by Vinay Brown RN  Outcome: Jael Wilfredo (Taken 11/12/2022 2000)  Free From Fall Injury: Instruct family/caregiver on patient safety

## 2022-11-14 PROBLEM — Z79.4 TYPE 2 DIABETES MELLITUS WITHOUT COMPLICATION, WITH LONG-TERM CURRENT USE OF INSULIN (HCC): Status: ACTIVE | Noted: 2022-11-14

## 2022-11-14 PROBLEM — R73.9 HYPERGLYCEMIA: Status: RESOLVED | Noted: 2022-11-09 | Resolved: 2022-11-14

## 2022-11-14 PROBLEM — I50.42 CHRONIC COMBINED SYSTOLIC AND DIASTOLIC CHF (CONGESTIVE HEART FAILURE) (HCC): Status: ACTIVE | Noted: 2022-11-14

## 2022-11-14 PROBLEM — E11.9 TYPE 2 DIABETES MELLITUS WITHOUT COMPLICATION, WITH LONG-TERM CURRENT USE OF INSULIN (HCC): Status: ACTIVE | Noted: 2022-11-14

## 2022-11-14 LAB
ABSOLUTE EOS #: 0.35 K/UL (ref 0–0.44)
ABSOLUTE IMMATURE GRANULOCYTE: 0.23 K/UL (ref 0–0.3)
ABSOLUTE LYMPH #: 1.76 K/UL (ref 1.1–3.7)
ABSOLUTE MONO #: 1.76 K/UL (ref 0.1–1.2)
ANION GAP SERPL CALCULATED.3IONS-SCNC: 9 MMOL/L (ref 9–17)
BASOPHILS # BLD: 1 % (ref 0–2)
BASOPHILS ABSOLUTE: 0.12 K/UL (ref 0–0.2)
BUN BLDV-MCNC: 18 MG/DL (ref 6–20)
CALCIUM SERPL-MCNC: 8.5 MG/DL (ref 8.6–10.4)
CHLORIDE BLD-SCNC: 101 MMOL/L (ref 98–107)
CO2: 25 MMOL/L (ref 20–31)
CREAT SERPL-MCNC: 0.84 MG/DL (ref 0.7–1.2)
EOSINOPHILS RELATIVE PERCENT: 3 % (ref 1–4)
GFR SERPL CREATININE-BSD FRML MDRD: >60 ML/MIN/1.73M2
GLUCOSE BLD-MCNC: 110 MG/DL (ref 75–110)
GLUCOSE BLD-MCNC: 112 MG/DL (ref 70–99)
GLUCOSE BLD-MCNC: 112 MG/DL (ref 75–110)
GLUCOSE BLD-MCNC: 115 MG/DL (ref 75–110)
GLUCOSE BLD-MCNC: 119 MG/DL (ref 75–110)
GLUCOSE BLD-MCNC: 125 MG/DL (ref 75–110)
GLUCOSE BLD-MCNC: 136 MG/DL (ref 75–110)
GLUCOSE BLD-MCNC: 140 MG/DL (ref 75–110)
GLUCOSE BLD-MCNC: 148 MG/DL (ref 75–110)
GLUCOSE BLD-MCNC: 154 MG/DL (ref 75–110)
GLUCOSE BLD-MCNC: 182 MG/DL (ref 75–110)
GLUCOSE BLD-MCNC: 188 MG/DL (ref 75–110)
GLUCOSE BLD-MCNC: 208 MG/DL (ref 75–110)
HCT VFR BLD CALC: 28.4 % (ref 40.7–50.3)
HEMOGLOBIN: 8.9 G/DL (ref 13–17)
IMMATURE GRANULOCYTES: 2 %
LYMPHOCYTES # BLD: 15 % (ref 24–43)
MCH RBC QN AUTO: 27.6 PG (ref 25.2–33.5)
MCHC RBC AUTO-ENTMCNC: 31.3 G/DL (ref 28.4–34.8)
MCV RBC AUTO: 87.9 FL (ref 82.6–102.9)
MONOCYTES # BLD: 15 % (ref 3–12)
MORPHOLOGY: NORMAL
NRBC AUTOMATED: 0 PER 100 WBC
PDW BLD-RTO: 13.8 % (ref 11.8–14.4)
PLATELET # BLD: 376 K/UL (ref 138–453)
PMV BLD AUTO: 9.9 FL (ref 8.1–13.5)
POTASSIUM SERPL-SCNC: 4.2 MMOL/L (ref 3.7–5.3)
RBC # BLD: 3.23 M/UL (ref 4.21–5.77)
SEG NEUTROPHILS: 64 % (ref 36–65)
SEGMENTED NEUTROPHILS ABSOLUTE COUNT: 7.48 K/UL (ref 1.5–8.1)
SODIUM BLD-SCNC: 135 MMOL/L (ref 135–144)
WBC # BLD: 11.7 K/UL (ref 3.5–11.3)

## 2022-11-14 PROCEDURE — 36415 COLL VENOUS BLD VENIPUNCTURE: CPT

## 2022-11-14 PROCEDURE — 82947 ASSAY GLUCOSE BLOOD QUANT: CPT

## 2022-11-14 PROCEDURE — 6360000002 HC RX W HCPCS

## 2022-11-14 PROCEDURE — 6370000000 HC RX 637 (ALT 250 FOR IP): Performed by: NURSE PRACTITIONER

## 2022-11-14 PROCEDURE — 6370000000 HC RX 637 (ALT 250 FOR IP): Performed by: STUDENT IN AN ORGANIZED HEALTH CARE EDUCATION/TRAINING PROGRAM

## 2022-11-14 PROCEDURE — 6370000000 HC RX 637 (ALT 250 FOR IP)

## 2022-11-14 PROCEDURE — 6370000000 HC RX 637 (ALT 250 FOR IP): Performed by: SURGERY

## 2022-11-14 PROCEDURE — 2060000000 HC ICU INTERMEDIATE R&B

## 2022-11-14 PROCEDURE — 80048 BASIC METABOLIC PNL TOTAL CA: CPT

## 2022-11-14 PROCEDURE — 85025 COMPLETE CBC W/AUTO DIFF WBC: CPT

## 2022-11-14 PROCEDURE — 99254 IP/OBS CNSLTJ NEW/EST MOD 60: CPT | Performed by: STUDENT IN AN ORGANIZED HEALTH CARE EDUCATION/TRAINING PROGRAM

## 2022-11-14 PROCEDURE — 2580000003 HC RX 258

## 2022-11-14 RX ORDER — INSULIN GLARGINE 100 [IU]/ML
15 INJECTION, SOLUTION SUBCUTANEOUS NIGHTLY
Status: DISCONTINUED | OUTPATIENT
Start: 2022-11-14 | End: 2022-11-14

## 2022-11-14 RX ORDER — INSULIN LISPRO 100 [IU]/ML
0-8 INJECTION, SOLUTION INTRAVENOUS; SUBCUTANEOUS
Status: DISCONTINUED | OUTPATIENT
Start: 2022-11-14 | End: 2022-11-17 | Stop reason: HOSPADM

## 2022-11-14 RX ORDER — INSULIN LISPRO 100 [IU]/ML
0-4 INJECTION, SOLUTION INTRAVENOUS; SUBCUTANEOUS NIGHTLY
Status: DISCONTINUED | OUTPATIENT
Start: 2022-11-14 | End: 2022-11-17 | Stop reason: HOSPADM

## 2022-11-14 RX ADMIN — ACETAMINOPHEN 1000 MG: 500 TABLET ORAL at 20:46

## 2022-11-14 RX ADMIN — METHOCARBAMOL TABLETS 750 MG: 750 TABLET, COATED ORAL at 08:16

## 2022-11-14 RX ADMIN — GABAPENTIN 300 MG: 300 CAPSULE ORAL at 12:32

## 2022-11-14 RX ADMIN — OXYCODONE 5 MG: 5 TABLET ORAL at 14:04

## 2022-11-14 RX ADMIN — GABAPENTIN 300 MG: 300 CAPSULE ORAL at 20:46

## 2022-11-14 RX ADMIN — EMPAGLIFLOZIN 10 MG: 10 TABLET, FILM COATED ORAL at 12:32

## 2022-11-14 RX ADMIN — ACETAMINOPHEN 1000 MG: 500 TABLET ORAL at 04:16

## 2022-11-14 RX ADMIN — SODIUM CHLORIDE, PRESERVATIVE FREE 10 ML: 5 INJECTION INTRAVENOUS at 20:49

## 2022-11-14 RX ADMIN — GABAPENTIN 300 MG: 300 CAPSULE ORAL at 04:12

## 2022-11-14 RX ADMIN — CARVEDILOL 25 MG: 25 TABLET, FILM COATED ORAL at 08:17

## 2022-11-14 RX ADMIN — FUROSEMIDE 20 MG: 20 TABLET ORAL at 08:17

## 2022-11-14 RX ADMIN — NAPROXEN 500 MG: 250 TABLET ORAL at 17:17

## 2022-11-14 RX ADMIN — OXYCODONE 5 MG: 5 TABLET ORAL at 01:55

## 2022-11-14 RX ADMIN — SODIUM CHLORIDE, PRESERVATIVE FREE 10 ML: 5 INJECTION INTRAVENOUS at 08:17

## 2022-11-14 RX ADMIN — FAMOTIDINE 20 MG: 20 TABLET, FILM COATED ORAL at 20:46

## 2022-11-14 RX ADMIN — ENOXAPARIN SODIUM 30 MG: 100 INJECTION SUBCUTANEOUS at 08:17

## 2022-11-14 RX ADMIN — HYDRALAZINE HYDROCHLORIDE 10 MG: 20 INJECTION INTRAMUSCULAR; INTRAVENOUS at 17:42

## 2022-11-14 RX ADMIN — ACETAMINOPHEN 1000 MG: 500 TABLET ORAL at 14:04

## 2022-11-14 RX ADMIN — METHOCARBAMOL TABLETS 750 MG: 750 TABLET, COATED ORAL at 01:55

## 2022-11-14 RX ADMIN — FAMOTIDINE 20 MG: 20 TABLET, FILM COATED ORAL at 08:17

## 2022-11-14 RX ADMIN — POLYETHYLENE GLYCOL 3350 17 G: 17 POWDER, FOR SOLUTION ORAL at 14:04

## 2022-11-14 RX ADMIN — ENOXAPARIN SODIUM 30 MG: 100 INJECTION SUBCUTANEOUS at 20:48

## 2022-11-14 RX ADMIN — METHOCARBAMOL TABLETS 750 MG: 750 TABLET, COATED ORAL at 15:00

## 2022-11-14 RX ADMIN — METHOCARBAMOL TABLETS 750 MG: 750 TABLET, COATED ORAL at 20:46

## 2022-11-14 RX ADMIN — NAPROXEN 500 MG: 250 TABLET ORAL at 08:16

## 2022-11-14 RX ADMIN — INSULIN LISPRO 2 UNITS: 100 INJECTION, SOLUTION INTRAVENOUS; SUBCUTANEOUS at 13:22

## 2022-11-14 RX ADMIN — DESMOPRESSIN ACETATE 40 MG: 0.2 TABLET ORAL at 08:17

## 2022-11-14 RX ADMIN — CARVEDILOL 25 MG: 25 TABLET, FILM COATED ORAL at 17:17

## 2022-11-14 ASSESSMENT — PAIN DESCRIPTION - LOCATION
LOCATION: HIP
LOCATION: HIP

## 2022-11-14 ASSESSMENT — PAIN DESCRIPTION - ORIENTATION
ORIENTATION: LEFT
ORIENTATION: LEFT

## 2022-11-14 ASSESSMENT — PAIN DESCRIPTION - DESCRIPTORS
DESCRIPTORS: ACHING
DESCRIPTORS: ACHING;DISCOMFORT

## 2022-11-14 ASSESSMENT — PAIN SCALES - GENERAL
PAINLEVEL_OUTOF10: 8
PAINLEVEL_OUTOF10: 6
PAINLEVEL_OUTOF10: 0
PAINLEVEL_OUTOF10: 7
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
## 2022-11-14 NOTE — PLAN OF CARE
Problem: Discharge Planning  Goal: Discharge to home or other facility with appropriate resources  11/14/2022 1752 by Ady Coffey RN  Outcome: Progressing  11/14/2022 0633 by Maeve Velez RN  Outcome: Progressing  Flowsheets (Taken 11/13/2022 2000)  Discharge to home or other facility with appropriate resources: Identify barriers to discharge with patient and caregiver     Problem: Pain  Goal: Verbalizes/displays adequate comfort level or baseline comfort level  11/14/2022 1752 by Ady Coffey RN  Outcome: Progressing  11/14/2022 0633 by Maeve Velez RN  Outcome: Progressing     Problem: Cardiovascular - Adult  Goal: Maintains optimal cardiac output and hemodynamic stability  11/14/2022 1752 by Ady Coffey RN  Outcome: Progressing  11/14/2022 0633 by Maeve Velez RN  Outcome: Progressing  Flowsheets (Taken 11/13/2022 2000)  Maintains optimal cardiac output and hemodynamic stability: Monitor blood pressure and heart rate  Goal: Absence of cardiac dysrhythmias or at baseline  11/14/2022 1752 by Ady Coffey RN  Outcome: Progressing  11/14/2022 0633 by Maeve Velez RN  Outcome: Progressing  Flowsheets (Taken 11/13/2022 2000)  Absence of cardiac dysrhythmias or at baseline: Monitor cardiac rate and rhythm     Problem: Skin/Tissue Integrity - Adult  Goal: Skin integrity remains intact  11/14/2022 1752 by Ady Coffey RN  Outcome: Progressing  11/14/2022 0633 by Maeve Velez RN  Outcome: Progressing  Flowsheets (Taken 11/13/2022 2000)  Skin Integrity Remains Intact: Monitor for areas of redness and/or skin breakdown  Goal: Incisions, wounds, or drain sites healing without S/S of infection  11/14/2022 1752 by Ady Coffey RN  Outcome: Progressing  11/14/2022 0633 by Maeve Velez RN  Outcome: Progressing  Flowsheets (Taken 11/13/2022 2000)  Incisions, Wounds, or Drain Sites Healing Without Sign and Symptoms of Infection: TWICE DAILY: Assess and document skin integrity  Goal: Oral mucous membranes remain intact  11/14/2022 1752 by Jillian Logan RN  Outcome: Progressing  11/14/2022 0633 by Mindy Parrish RN  Outcome: Progressing  Flowsheets (Taken 11/13/2022 2000)  Oral Mucous Membranes Remain Intact: Assess oral mucosa and hygiene practices     Problem: Musculoskeletal - Adult  Goal: Return mobility to safest level of function  11/14/2022 1752 by Jillian Logan RN  Outcome: Progressing  11/14/2022 0633 by Mindy Parrish RN  Outcome: Progressing  Flowsheets (Taken 11/13/2022 2000)  Return Mobility to Safest Level of Function: Assess patient stability and activity tolerance for standing, transferring and ambulating with or without assistive devices  Goal: Maintain proper alignment of affected body part  11/14/2022 1752 by Jillian Logan RN  Outcome: Progressing  11/14/2022 0633 by Mindy Parrish RN  Outcome: Progressing  Goal: Return ADL status to a safe level of function  11/14/2022 1752 by Jillian Logan RN  Outcome: Progressing  11/14/2022 0633 by Mindy Parrish RN  Outcome: Progressing     Problem: Skin/Tissue Integrity  Goal: Absence of new skin breakdown  Description: 1. Monitor for areas of redness and/or skin breakdown  2. Assess vascular access sites hourly  3. Every 4-6 hours minimum:  Change oxygen saturation probe site  4. Every 4-6 hours:  If on nasal continuous positive airway pressure, respiratory therapy assess nares and determine need for appliance change or resting period.   11/14/2022 1752 by Jillian Logan RN  Outcome: Progressing  11/14/2022 0633 by Mindy Parrish RN  Outcome: Progressing     Problem: ABCDS Injury Assessment  Goal: Absence of physical injury  11/14/2022 1752 by Jillian Logan RN  Outcome: Progressing  11/14/2022 0633 by Mindy Parrish RN  Outcome: Progressing  Flowsheets (Taken 11/13/2022 2000)  Absence of Physical Injury: Implement safety measures based on patient assessment     Problem: Safety - Adult  Goal: Free from fall injury  11/14/2022 1752 by Jillian Logan RN  Outcome: Progressing  11/14/2022 0633 by Krysten Bagley RN  Outcome: Progressing  Flowsheets (Taken 11/13/2022 2000)  Free From Fall Injury: Instruct family/caregiver on patient safety     Problem: Chronic Conditions and Co-morbidities  Goal: Patient's chronic conditions and co-morbidity symptoms are monitored and maintained or improved  Outcome: Progressing

## 2022-11-14 NOTE — PLAN OF CARE
Problem: Discharge Planning  Goal: Discharge to home or other facility with appropriate resources  Outcome: Progressing  Flowsheets (Taken 11/13/2022 2000)  Discharge to home or other facility with appropriate resources: Identify barriers to discharge with patient and caregiver     Problem: Pain  Goal: Verbalizes/displays adequate comfort level or baseline comfort level  11/14/2022 0633 by Courtney Gong RN  Outcome: Progressing  11/13/2022 1651 by Thien Mayer RN  Outcome: Progressing     Problem: Cardiovascular - Adult  Goal: Maintains optimal cardiac output and hemodynamic stability  11/14/2022 0633 by Courtney Gong RN  Outcome: Progressing  Flowsheets (Taken 11/13/2022 2000)  Maintains optimal cardiac output and hemodynamic stability: Monitor blood pressure and heart rate  11/13/2022 1651 by Thien Mayer RN  Outcome: Progressing  Goal: Absence of cardiac dysrhythmias or at baseline  Outcome: Progressing  Flowsheets (Taken 11/13/2022 2000)  Absence of cardiac dysrhythmias or at baseline: Monitor cardiac rate and rhythm     Problem: Skin/Tissue Integrity - Adult  Goal: Skin integrity remains intact  Outcome: Progressing  Flowsheets (Taken 11/13/2022 2000)  Skin Integrity Remains Intact: Monitor for areas of redness and/or skin breakdown  Goal: Incisions, wounds, or drain sites healing without S/S of infection  Outcome: Progressing  Flowsheets (Taken 11/13/2022 2000)  Incisions, Wounds, or Drain Sites Healing Without Sign and Symptoms of Infection: TWICE DAILY: Assess and document skin integrity  Goal: Oral mucous membranes remain intact  Outcome: Progressing  Flowsheets (Taken 11/13/2022 2000)  Oral Mucous Membranes Remain Intact: Assess oral mucosa and hygiene practices     Problem: Musculoskeletal - Adult  Goal: Return mobility to safest level of function  11/14/2022 0633 by Courtney Gong RN  Outcome: Progressing  Flowsheets (Taken 11/13/2022 2000)  Return Mobility to Safest Level of Function: Assess patient stability and activity tolerance for standing, transferring and ambulating with or without assistive devices  11/13/2022 1651 by Radha Elizabeth RN  Outcome: Progressing  Goal: Maintain proper alignment of affected body part  11/14/2022 0633 by Carol Alcazar RN  Outcome: Progressing  11/13/2022 1651 by Radha Elizabeth RN  Outcome: Progressing  Goal: Return ADL status to a safe level of function  11/14/2022 0633 by Carol Alcazar RN  Outcome: Progressing  11/13/2022 1651 by Radha Elizabeth RN  Outcome: Progressing     Problem: Skin/Tissue Integrity  Goal: Absence of new skin breakdown  Description: 1. Monitor for areas of redness and/or skin breakdown  2. Assess vascular access sites hourly  3. Every 4-6 hours minimum:  Change oxygen saturation probe site  4. Every 4-6 hours:  If on nasal continuous positive airway pressure, respiratory therapy assess nares and determine need for appliance change or resting period.   11/14/2022 0120 by Carol Alcazar RN  Outcome: Progressing  11/13/2022 1651 by Radha Elizabeth RN  Outcome: Progressing     Problem: ABCDS Injury Assessment  Goal: Absence of physical injury  11/14/2022 0633 by Carol Alcazar RN  Outcome: Progressing  Flowsheets (Taken 11/13/2022 2000)  Absence of Physical Injury: Implement safety measures based on patient assessment  11/13/2022 1651 by Radha Elizabeth RN  Outcome: Progressing     Problem: Safety - Adult  Goal: Free from fall injury  11/14/2022 0633 by Carol Alcazar RN  Outcome: Karissa Cart (Taken 11/13/2022 2000)  Free From Fall Injury: Instruct family/caregiver on patient safety  11/13/2022 1651 by Radha Elizabeth RN  Outcome: Progressing

## 2022-11-14 NOTE — CARE COORDINATION
Transitional planning    Writer placed call to [facility] to follow up on referral, left vm with request for return call. 0830 call back from Saeid with [facility], elanas states she called on Friday and denied placement , will obtain more choices. [id number] writer to room with SNF list, discussed with patient, per freedom of choice requesting referral to [facility], sent. 1030 call from ashley Franco. 1430 writer to room to discuss new choices, patient to look at list.      1515 writer to room , patient chose [facility], referral sent.       [phone] call back from Allie with [facility] requesting faxed clinicals to [phone]

## 2022-11-15 LAB
ABSOLUTE EOS #: 0.34 K/UL (ref 0–0.44)
ABSOLUTE IMMATURE GRANULOCYTE: 0.25 K/UL (ref 0–0.3)
ABSOLUTE LYMPH #: 1.45 K/UL (ref 1.1–3.7)
ABSOLUTE MONO #: 1.4 K/UL (ref 0.1–1.2)
ANION GAP SERPL CALCULATED.3IONS-SCNC: 8 MMOL/L (ref 9–17)
BASOPHILS # BLD: 1 % (ref 0–2)
BASOPHILS ABSOLUTE: 0.06 K/UL (ref 0–0.2)
BUN BLDV-MCNC: 20 MG/DL (ref 6–20)
CALCIUM SERPL-MCNC: 8.4 MG/DL (ref 8.6–10.4)
CHLORIDE BLD-SCNC: 95 MMOL/L (ref 98–107)
CO2: 25 MMOL/L (ref 20–31)
CREAT SERPL-MCNC: 0.8 MG/DL (ref 0.7–1.2)
EOSINOPHILS RELATIVE PERCENT: 3 % (ref 1–4)
GFR SERPL CREATININE-BSD FRML MDRD: >60 ML/MIN/1.73M2
GLUCOSE BLD-MCNC: 132 MG/DL (ref 70–99)
GLUCOSE BLD-MCNC: 134 MG/DL (ref 75–110)
GLUCOSE BLD-MCNC: 173 MG/DL (ref 75–110)
GLUCOSE BLD-MCNC: 224 MG/DL (ref 75–110)
GLUCOSE BLD-MCNC: 246 MG/DL (ref 75–110)
HCT VFR BLD CALC: 27.4 % (ref 40.7–50.3)
HEMOGLOBIN: 8.4 G/DL (ref 13–17)
IMMATURE GRANULOCYTES: 2 %
LYMPHOCYTES # BLD: 14 % (ref 24–43)
MCH RBC QN AUTO: 27.9 PG (ref 25.2–33.5)
MCHC RBC AUTO-ENTMCNC: 30.7 G/DL (ref 28.4–34.8)
MCV RBC AUTO: 91 FL (ref 82.6–102.9)
MONOCYTES # BLD: 14 % (ref 3–12)
NRBC AUTOMATED: 0 PER 100 WBC
PDW BLD-RTO: 13.9 % (ref 11.8–14.4)
PLATELET # BLD: 375 K/UL (ref 138–453)
PMV BLD AUTO: 10 FL (ref 8.1–13.5)
POTASSIUM SERPL-SCNC: 3.9 MMOL/L (ref 3.7–5.3)
RBC # BLD: 3.01 M/UL (ref 4.21–5.77)
SEG NEUTROPHILS: 66 % (ref 36–65)
SEGMENTED NEUTROPHILS ABSOLUTE COUNT: 6.84 K/UL (ref 1.5–8.1)
SERUM OSMOLALITY: 287 MOSM/KG (ref 275–295)
SODIUM BLD-SCNC: 128 MMOL/L (ref 135–144)
SODIUM BLD-SCNC: 136 MMOL/L (ref 135–144)
SODIUM BLD-SCNC: 136 MMOL/L (ref 135–144)
WBC # BLD: 10.3 K/UL (ref 3.5–11.3)

## 2022-11-15 PROCEDURE — 6370000000 HC RX 637 (ALT 250 FOR IP): Performed by: STUDENT IN AN ORGANIZED HEALTH CARE EDUCATION/TRAINING PROGRAM

## 2022-11-15 PROCEDURE — 6370000000 HC RX 637 (ALT 250 FOR IP)

## 2022-11-15 PROCEDURE — 82947 ASSAY GLUCOSE BLOOD QUANT: CPT

## 2022-11-15 PROCEDURE — 6360000002 HC RX W HCPCS: Performed by: FAMILY MEDICINE

## 2022-11-15 PROCEDURE — 85025 COMPLETE CBC W/AUTO DIFF WBC: CPT

## 2022-11-15 PROCEDURE — 99232 SBSQ HOSP IP/OBS MODERATE 35: CPT | Performed by: FAMILY MEDICINE

## 2022-11-15 PROCEDURE — 83930 ASSAY OF BLOOD OSMOLALITY: CPT

## 2022-11-15 PROCEDURE — 97535 SELF CARE MNGMENT TRAINING: CPT

## 2022-11-15 PROCEDURE — 6370000000 HC RX 637 (ALT 250 FOR IP): Performed by: NURSE PRACTITIONER

## 2022-11-15 PROCEDURE — 80048 BASIC METABOLIC PNL TOTAL CA: CPT

## 2022-11-15 PROCEDURE — 97530 THERAPEUTIC ACTIVITIES: CPT

## 2022-11-15 PROCEDURE — 2060000000 HC ICU INTERMEDIATE R&B

## 2022-11-15 PROCEDURE — 36415 COLL VENOUS BLD VENIPUNCTURE: CPT

## 2022-11-15 PROCEDURE — 6370000000 HC RX 637 (ALT 250 FOR IP): Performed by: SURGERY

## 2022-11-15 PROCEDURE — 97110 THERAPEUTIC EXERCISES: CPT

## 2022-11-15 PROCEDURE — 2580000003 HC RX 258

## 2022-11-15 PROCEDURE — 6360000002 HC RX W HCPCS

## 2022-11-15 PROCEDURE — 84295 ASSAY OF SERUM SODIUM: CPT

## 2022-11-15 RX ORDER — CHOLECALCIFEROL (VITAMIN D3) 125 MCG
5 CAPSULE ORAL NIGHTLY PRN
Status: DISCONTINUED | OUTPATIENT
Start: 2022-11-15 | End: 2022-11-17

## 2022-11-15 RX ORDER — METHOCARBAMOL 750 MG/1
750 TABLET, FILM COATED ORAL EVERY 6 HOURS
Qty: 60 TABLET | Refills: 0 | Status: SHIPPED | OUTPATIENT
Start: 2022-11-15 | End: 2022-11-30

## 2022-11-15 RX ORDER — NAPROXEN 500 MG/1
500 TABLET ORAL 2 TIMES DAILY WITH MEALS
Qty: 30 TABLET | Refills: 0 | Status: SHIPPED | OUTPATIENT
Start: 2022-11-15 | End: 2022-11-30

## 2022-11-15 RX ORDER — GABAPENTIN 300 MG/1
300 CAPSULE ORAL EVERY 8 HOURS
Qty: 45 CAPSULE | Refills: 0 | Status: SHIPPED | OUTPATIENT
Start: 2022-11-15 | End: 2022-11-30

## 2022-11-15 RX ORDER — ACETAMINOPHEN 500 MG
1000 TABLET ORAL EVERY 8 HOURS PRN
Qty: 90 TABLET | Refills: 0 | Status: SHIPPED | OUTPATIENT
Start: 2022-11-15 | End: 2022-11-30

## 2022-11-15 RX ORDER — OXYCODONE HYDROCHLORIDE 5 MG/1
5 TABLET ORAL EVERY 6 HOURS PRN
Qty: 12 TABLET | Refills: 0 | Status: SHIPPED | OUTPATIENT
Start: 2022-11-15 | End: 2022-11-18

## 2022-11-15 RX ORDER — CHOLECALCIFEROL (VITAMIN D3) 125 MCG
5 CAPSULE ORAL
Status: DISCONTINUED | OUTPATIENT
Start: 2022-11-15 | End: 2022-11-15

## 2022-11-15 RX ORDER — FUROSEMIDE 10 MG/ML
20 INJECTION INTRAMUSCULAR; INTRAVENOUS ONCE
Status: COMPLETED | OUTPATIENT
Start: 2022-11-15 | End: 2022-11-15

## 2022-11-15 RX ADMIN — GABAPENTIN 300 MG: 300 CAPSULE ORAL at 05:08

## 2022-11-15 RX ADMIN — ACETAMINOPHEN 1000 MG: 500 TABLET ORAL at 05:08

## 2022-11-15 RX ADMIN — CARVEDILOL 25 MG: 25 TABLET, FILM COATED ORAL at 09:00

## 2022-11-15 RX ADMIN — LISINOPRIL 20 MG: 20 TABLET ORAL at 09:00

## 2022-11-15 RX ADMIN — POLYETHYLENE GLYCOL 3350 17 G: 17 POWDER, FOR SOLUTION ORAL at 09:00

## 2022-11-15 RX ADMIN — Medication 5 MG: at 21:50

## 2022-11-15 RX ADMIN — CARVEDILOL 25 MG: 25 TABLET, FILM COATED ORAL at 17:51

## 2022-11-15 RX ADMIN — OXYCODONE 5 MG: 5 TABLET ORAL at 21:50

## 2022-11-15 RX ADMIN — FUROSEMIDE 20 MG: 10 INJECTION, SOLUTION INTRAMUSCULAR; INTRAVENOUS at 17:55

## 2022-11-15 RX ADMIN — ACETAMINOPHEN 1000 MG: 500 TABLET ORAL at 13:42

## 2022-11-15 RX ADMIN — FAMOTIDINE 20 MG: 20 TABLET, FILM COATED ORAL at 09:00

## 2022-11-15 RX ADMIN — OXYCODONE 5 MG: 5 TABLET ORAL at 11:35

## 2022-11-15 RX ADMIN — FUROSEMIDE 20 MG: 20 TABLET ORAL at 09:00

## 2022-11-15 RX ADMIN — ACETAMINOPHEN 1000 MG: 500 TABLET ORAL at 20:52

## 2022-11-15 RX ADMIN — OXYCODONE 5 MG: 5 TABLET ORAL at 06:45

## 2022-11-15 RX ADMIN — NAPROXEN 500 MG: 250 TABLET ORAL at 17:50

## 2022-11-15 RX ADMIN — DESMOPRESSIN ACETATE 40 MG: 0.2 TABLET ORAL at 09:00

## 2022-11-15 RX ADMIN — SODIUM CHLORIDE, PRESERVATIVE FREE 10 ML: 5 INJECTION INTRAVENOUS at 20:53

## 2022-11-15 RX ADMIN — ENOXAPARIN SODIUM 30 MG: 100 INJECTION SUBCUTANEOUS at 09:00

## 2022-11-15 RX ADMIN — FAMOTIDINE 20 MG: 20 TABLET, FILM COATED ORAL at 20:52

## 2022-11-15 RX ADMIN — NAPROXEN 500 MG: 250 TABLET ORAL at 09:00

## 2022-11-15 RX ADMIN — METHOCARBAMOL TABLETS 750 MG: 750 TABLET, COATED ORAL at 13:43

## 2022-11-15 RX ADMIN — OXYCODONE 5 MG: 5 TABLET ORAL at 17:51

## 2022-11-15 RX ADMIN — INSULIN LISPRO 2 UNITS: 100 INJECTION, SOLUTION INTRAVENOUS; SUBCUTANEOUS at 13:50

## 2022-11-15 RX ADMIN — AMLODIPINE BESYLATE 10 MG: 10 TABLET ORAL at 09:00

## 2022-11-15 RX ADMIN — METHOCARBAMOL TABLETS 750 MG: 750 TABLET, COATED ORAL at 01:43

## 2022-11-15 RX ADMIN — Medication 5 MG: at 03:04

## 2022-11-15 RX ADMIN — EMPAGLIFLOZIN 10 MG: 10 TABLET, FILM COATED ORAL at 09:00

## 2022-11-15 RX ADMIN — GABAPENTIN 300 MG: 300 CAPSULE ORAL at 20:52

## 2022-11-15 RX ADMIN — ENOXAPARIN SODIUM 30 MG: 100 INJECTION SUBCUTANEOUS at 20:53

## 2022-11-15 RX ADMIN — SODIUM CHLORIDE, PRESERVATIVE FREE 10 ML: 5 INJECTION INTRAVENOUS at 09:00

## 2022-11-15 RX ADMIN — METHOCARBAMOL TABLETS 750 MG: 750 TABLET, COATED ORAL at 09:00

## 2022-11-15 RX ADMIN — OXYCODONE 5 MG: 5 TABLET ORAL at 02:42

## 2022-11-15 RX ADMIN — GABAPENTIN 300 MG: 300 CAPSULE ORAL at 12:30

## 2022-11-15 RX ADMIN — METHOCARBAMOL TABLETS 750 MG: 750 TABLET, COATED ORAL at 20:52

## 2022-11-15 ASSESSMENT — PAIN SCALES - GENERAL
PAINLEVEL_OUTOF10: 7
PAINLEVEL_OUTOF10: 8
PAINLEVEL_OUTOF10: 10
PAINLEVEL_OUTOF10: 6
PAINLEVEL_OUTOF10: 9

## 2022-11-15 ASSESSMENT — ENCOUNTER SYMPTOMS
ABDOMINAL PAIN: 0
WHEEZING: 0
DIARRHEA: 0
COUGH: 0
VOMITING: 0
NAUSEA: 0
CONSTIPATION: 0
SHORTNESS OF BREATH: 0
CHEST TIGHTNESS: 0
BLOOD IN STOOL: 0

## 2022-11-15 ASSESSMENT — PAIN DESCRIPTION - ORIENTATION
ORIENTATION: LEFT

## 2022-11-15 ASSESSMENT — PAIN DESCRIPTION - LOCATION
LOCATION: HIP

## 2022-11-15 ASSESSMENT — PAIN DESCRIPTION - DESCRIPTORS
DESCRIPTORS: ACHING

## 2022-11-15 NOTE — PLAN OF CARE
Problem: Discharge Planning  Goal: Discharge to home or other facility with appropriate resources  Outcome: Progressing     Problem: Pain  Goal: Verbalizes/displays adequate comfort level or baseline comfort level  Outcome: Progressing     Problem: Cardiovascular - Adult  Goal: Maintains optimal cardiac output and hemodynamic stability  Outcome: Progressing  Goal: Absence of cardiac dysrhythmias or at baseline  Outcome: Progressing     Problem: Skin/Tissue Integrity - Adult  Goal: Skin integrity remains intact  Outcome: Progressing  Goal: Incisions, wounds, or drain sites healing without S/S of infection  Outcome: Progressing  Goal: Oral mucous membranes remain intact  Outcome: Progressing     Problem: Musculoskeletal - Adult  Goal: Return mobility to safest level of function  Outcome: Progressing  Goal: Maintain proper alignment of affected body part  Outcome: Progressing  Goal: Return ADL status to a safe level of function  Outcome: Progressing     Problem: Skin/Tissue Integrity  Goal: Absence of new skin breakdown  Description: 1. Monitor for areas of redness and/or skin breakdown  2. Assess vascular access sites hourly  3. Every 4-6 hours minimum:  Change oxygen saturation probe site  4. Every 4-6 hours:  If on nasal continuous positive airway pressure, respiratory therapy assess nares and determine need for appliance change or resting period.   Outcome: Progressing     Problem: ABCDS Injury Assessment  Goal: Absence of physical injury  Outcome: Progressing     Problem: Safety - Adult  Goal: Free from fall injury  Outcome: Progressing     Problem: Chronic Conditions and Co-morbidities  Goal: Patient's chronic conditions and co-morbidity symptoms are monitored and maintained or improved  Outcome: Progressing     Problem: Nutrition Deficit:  Goal: Optimize nutritional status  11/15/2022 1811 by Eric Wheatley RN  Outcome: Progressing  11/15/2022 1052 by Lyssa Rosas RD  Flowsheets (Taken 11/15/2022 1052)  Nutrient intake appropriate for improving, restoring, or maintaining nutritional needs:   Assess nutritional status and recommend course of action   Monitor oral intake, labs, and treatment plans   Recommend appropriate diets, oral nutritional supplements, and vitamin/mineral supplements   Order, calculate, and assess calorie counts as needed 04-Feb-2020
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
## 2022-11-15 NOTE — CARE COORDINATION
Transitional planning    Call received from Allie with [address], unable to accept patient due to hx. Writer to room to discuss new choices, patient on phone, will return at a later time. 1500 writer to room , d/c plan discussed and need for SNF but getting a lot of denials due to hx. Patient requesting to stay near [place], then became tearful and lab came in. Will reattempt more choices at a later time.

## 2022-11-15 NOTE — PLAN OF CARE
Problem: Discharge Planning  Goal: Discharge to home or other facility with appropriate resources  11/15/2022 0227 by Shavon Alcantara RN  Outcome: Progressing  11/14/2022 1752 by Beverly Mercer RN  Outcome: Progressing     Problem: Pain  Goal: Verbalizes/displays adequate comfort level or baseline comfort level  11/15/2022 0227 by Shavon Alcantara RN  Outcome: Progressing  11/14/2022 1752 by Beverly Mercer RN  Outcome: Progressing     Problem: Cardiovascular - Adult  Goal: Maintains optimal cardiac output and hemodynamic stability  11/15/2022 0227 by Shavon Alcantara RN  Outcome: Progressing  11/14/2022 1752 by Beverly Mercer RN  Outcome: Progressing  Goal: Absence of cardiac dysrhythmias or at baseline  11/15/2022 0227 by Shavon Alcantara RN  Outcome: Progressing  11/14/2022 1752 by Beverly Mercer RN  Outcome: Progressing     Problem: Skin/Tissue Integrity - Adult  Goal: Skin integrity remains intact  11/15/2022 0227 by Shavon Alcantara RN  Outcome: Progressing  11/14/2022 1752 by Beverly Mercer RN  Outcome: Progressing  Goal: Incisions, wounds, or drain sites healing without S/S of infection  11/15/2022 0227 by Shavon Alcantara RN  Outcome: Progressing  11/14/2022 1752 by Beverly Mercer RN  Outcome: Progressing  Goal: Oral mucous membranes remain intact  11/15/2022 0227 by Shavon Alcantara RN  Outcome: Progressing  11/14/2022 1752 by Beverly Mercer RN  Outcome: Progressing     Problem: Musculoskeletal - Adult  Goal: Return mobility to safest level of function  11/15/2022 0227 by Shavon Alcantara RN  Outcome: Progressing  11/14/2022 1752 by Beverly Mercer RN  Outcome: Progressing  Goal: Maintain proper alignment of affected body part  11/15/2022 0227 by Shavon Alcantara RN  Outcome: Progressing  11/14/2022 1752 by Beverly Mercer RN  Outcome: Progressing  Goal: Return ADL status to a safe level of function  11/15/2022 0227 by Shavon Alcantara RN  Outcome: Progressing  11/14/2022 1752 by Ani Day RN  Outcome: Progressing     Problem: Skin/Tissue Integrity  Goal: Absence of new skin breakdown  Description: 1. Monitor for areas of redness and/or skin breakdown  2. Assess vascular access sites hourly  3. Every 4-6 hours minimum:  Change oxygen saturation probe site  4. Every 4-6 hours:  If on nasal continuous positive airway pressure, respiratory therapy assess nares and determine need for appliance change or resting period.   11/15/2022 0227 by Teresa Snowden RN  Outcome: Progressing  11/14/2022 1752 by Ani Day RN  Outcome: Progressing     Problem: ABCDS Injury Assessment  Goal: Absence of physical injury  11/15/2022 0227 by Teresa Snowden RN  Outcome: Progressing  11/14/2022 1752 by Ani Day RN  Outcome: Progressing     Problem: Safety - Adult  Goal: Free from fall injury  11/15/2022 0227 by Teresa Snowden RN  Outcome: Progressing  11/14/2022 1752 by Ani Day RN  Outcome: Progressing     Problem: Chronic Conditions and Co-morbidities  Goal: Patient's chronic conditions and co-morbidity symptoms are monitored and maintained or improved  11/15/2022 0227 by Teresa Snowden RN  Outcome: Progressing  11/14/2022 1752 by Ani Day RN  Outcome: Progressing
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
## 2022-11-15 NOTE — PROGRESS NOTES
Occupational Therapy  Facility/Department: [address] STEPDOWN  Occupational Therapy Daily Treatment Note    Name: Prashanth Mcknight  : 1970  MRN: [id number]  Date of Service: 11/15/2022    Discharge Recommendations:  Patient would benefit from continued therapy after discharge in order to increase pt balance, strength and independence. Pt emotional throughout session req therapeutic use of self and emotional support. Assessment   Performance deficits / Impairments: Decreased functional mobility ; Decreased safe awareness;Decreased balance;Decreased ADL status; Decreased endurance;Decreased high-level IADLs;Decreased strength  Prognosis: Good  REQUIRES OT FOLLOW-UP: Yes  Activity Tolerance  Activity Tolerance: Patient limited by pain; Patient limited by fatigue        Plan   Occupational Therapy Plan  Times Per Week: 4-5 x week     Restrictions  Restrictions/Precautions  Restrictions/Precautions: Weight Bearing, Surgical Protocols, Fall Risk, Up as Tolerated  Required Braces or Orthoses?: No  Lower Extremity Weight Bearing Restrictions  Right Lower Extremity Weight Bearing: Weight Bearing As Tolerated  Left Lower Extremity Weight Bearing: Toe Touch Weight Bearing  Position Activity Restriction  Other position/activity restrictions: Left acetabulum fracture s/p ORIF    Subjective   General  Chart Reviewed: Yes  Family / Caregiver Present: No  General Comment  Comments: Pt and RN agreeable to therapy this day. Pt supine in bed at start of session and retired to supine in bed at session end with bed alarm on, call light in reach and all needs met. Pt c/o \"11/10\" pain in LLE with activity         Objective        Safety Devices  Type of Devices: Gait belt;Left in bed;Call light within reach; Bed alarm in place; Patient at risk for falls  Restraints  Restraints Initially in Place: No  Balance  Sitting: Without support (Pt tolerated approx 10 min static sitting unsupported at EOB SBA)  Standing: With support (min A x2 static standing with RW req Mod verbal cues on TTWB maintaince pt demo P/F carry over tolerating approx 2 min)  Gait  Overall Level of Assistance:  (pt unable to hop completed modified stand pivot)  Toilet Transfers  Toilet - Technique: Stand pivot  Equipment Used: Extra wide bedside commode  Toilet Transfer: 2 Person assistance; Moderate assistance  Toilet Transfers Comments: utilizing RW     ADL  Grooming: Modified independent ;Setup  Grooming Skilled Clinical Factors: Face washing facilitated supine in bed  UE Dressing: Stand by assistance;Setup;Verbal cueing; Increased time to complete  UE Dressing Skilled Clinical Factors: To don gown supine in bed pt able to thread BUE  Toileting: Maximum assistance;Setup; Increased time to complete  Toileting Skilled Clinical Factors: Max A for brief management and personal hygiene req Mod A x2 for [facility] transfer  Additional Comments: Throughout session pt limited per pain, fatigue and decreased WB adherence. Further ADLs not attempted d/t pt fatigue     Activity Tolerance  Activity Tolerance: Patient limited by pain; Patient limited by endurance; Patient limited by fatigue  Bed mobility  Supine to Sit: Moderate assistance;2 Person assistance  Sit to Supine: Moderate assistance;2 Person assistance  Scooting: Contact guard assistance  Bed Mobility Comments: HOB elevated, utilizing bed rails  Transfers  Stand Pivot Transfers: Moderate assistance;2 Person assistance  Sit to stand: Moderate assistance;2 Person assistance  Stand to sit: Moderate assistance;2 Person assistance  Transfer Comments: utilizing RW req cues on proper hand and foot placement and mod verbal cues to maintain TTWB     Cognition  Overall Cognitive Status: Exceptions  Arousal/Alertness: Appropriate responses to stimuli  Following Commands: Follows multistep commands with repitition; Follows multistep commands with increased time  Attention Span: Attends with cues to redirect  Safety Judgement: Decreased awareness of need for assistance  Problem Solving: Decreased awareness of errors  Insights: Decreased awareness of deficits  Initiation: Requires cues for some  Sequencing: Requires cues for some  Cognition Comment: talkative req redirection  Orientation  Overall Orientation Status: Within Functional Limits                  Education Given To: Patient  Education Provided: Role of Therapy;Transfer Training;Precautions  Education Provided Comments: proper hand and foot placement; WB adherence; walker management; stand pivot-P/F carry over  Education Method: Verbal;Demonstration  Education Outcome: Continued education needed                                 AM-PAC Score        AM-[facility] Inpatient Daily Activity Raw Score: 17 (11/15/22 1558)  AM-PAC Inpatient ADL T-Scale Score : 37.26 (11/15/22 1558)  ADL Inpatient CMS 0-100% Score: 50.11 (11/15/22 1558)  ADL Inpatient CMS G-Code Modifier : CK (11/15/22 1558)        Goals  Short Term Goals  Time Frame for Short Term Goals: Pt will, by discharge:  Short Term Goal 1: Dem min a for bed mobility  Short Term Goal 2: Dem min x 2 for sit to stand transfers for daily occupations  Short Term Goal 3: Dem mod x 2 for fucntional mobility with R walker maintaining TTWB LLE for balance only  Short Term Goal 4: Dem mod a for adl performance with use of AE as needed  Short Term Goal 5: Dem 4 min static standing with mod a follwoing restrictions on LLE for hygiene purposes  Short Term Goal 6: Dem good safety awareness tech for all transfers/mobility       Therapy Time   Individual Concurrent Group Co-treatment   Time In 1031         Time Out 1110         Minutes 39         Timed Code Treatment Minutes: 23 Minutes (co tx with PTA)       HUONG Lester/KALLIE

## 2022-11-15 NOTE — PROGRESS NOTES
PROGRESS NOTE          PATIENT NAME: Patt Rubin  MEDICAL RECORD NO. [id number]  DATE: 11/15/2022  SURGEON: oRbbi Castanon  PRIMARY CARE PHYSICIAN: No primary care provider on file. HD: # 7    ASSESSMENT    Patient Active Problem List   Diagnosis    Multiple closed fractures of pelvis, initial encounter ([facility] [address]    Hypertension    Acetabulum fracture, left ([facility] [address]    Pelvic hematoma in male    Type 2 diabetes mellitus without complication, with long-term current use of insulin (H[facility])    Chronic combined systolic and diastolic CHF (congestive heart failure) [facility])       MEDICAL DECISION MAKING AND PLAN    Acute renal injury, resolved              Creatinine normalized                     L inferior and superior pubic rami fxs, L anterior and posterior acetabular wall fxs, L iliac wing fx pelvic hematoma              ORIF left acetabulum and pelvis                           drains x2                                     Activity/ weight bearing status: WBAT RLE, TTWB LLE              PT/OT               MRI L knee 11/10 - chronic changes        DM2 (A1c 8.6)                                                IM consult for DM mgmt    Jardiance 10mg daily and MDSS     HTN                          Coreg restarted. Resume lisinopril. Hx HTN, HLD, CAD, non ischemic cardiomyopathy, DM, CKD, morbid obesity, methamphetamine abuse    Chief Complaint: \"pelvic pain\"    SUBJECTIVE    Patt Rubin is is unchanged since yesterday. Patient resting in bed at time of exam. Patient states his pelvis is sore this morning. Has been tolerating a diet, passing flatus, and urinating. Denies paresthesias or weakness.       OBJECTIVE  VITALS: Temp: Temp: 98.3 °F (36.8 °C)Temp  Av.3 °F (36.8 °C)  Min: 97.9 °F (36.6 °C)  Max: 98.8 °F (26.6 °C) BP Systolic (66RVO), YWS:930 , Min:116 , NRU:903   Diastolic (82AXT), TTN:65, Min:70, Max:101   Pulse Pulse  Av.3  Min: 72  Max: 82 Resp Resp  Av.3  Min: 10  Max: 14 Pulse ox SpO2  Av.3 %  Min: 95 %  Max: 99 %  GENERAL: alert, no distress  NEURO: sensation and strength intact distally  HEENT: normocephalic, atraumatic  LUNGS: no increased work of breathing  HEART: normal rate and regular rhythm  ABDOMEN: soft, non-tender, non-distended, and no guarding or peritoneal signs present  EXTREMITY: no cyanosis, clubbing or edema    I/O last 3 completed shifts: In: 5675.6 [P.O.:2150; I.V.:3525.6]  Out: 2455 [Urine:2350; Drains:105]    Drain/tube output:  In: 2150 [P.O.:2150]  Out: 2350 [Urine:2350]    LAB:  CBC:   Recent Labs     11/13/22 0520 11/14/22 0510 11/15/22  0513   WBC 11.1 11.7* 10.3   HGB 9.9* 8.9* 8.4*   HCT 32.7* 28.4* 27.4*   MCV 90.1 87.9 91.0    376 375     BMP:   Recent Labs     11/13/22 0520 11/14/22  0510 11/15/22  0513    135 128*   K 3.9 4.2 3.9    101 95*   CO2 24 25 25   BUN 16 18 20   CREATININE 0.70 0.84 0.80   GLUCOSE 134* 112* 132*     COAGS: No results for input(s): APTT, PROT, INR in the last 72 hours. RADIOLOGY:  No new imaging      Mari TiwariDO  11/15/22, 7:40 AM      Attestation signed by Shruthi Novoa MD    I personally evaluated the patient and directed the medical decision making with Resident/EARL after the physical/radiologic exam and laboratory values were reviewed and confirmed.       Shruthi Novoa MD
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
## 2022-11-16 LAB
ANION GAP SERPL CALCULATED.3IONS-SCNC: 8 MMOL/L (ref 9–17)
BUN BLDV-MCNC: 24 MG/DL (ref 6–20)
CALCIUM SERPL-MCNC: 8.4 MG/DL (ref 8.6–10.4)
CHLORIDE BLD-SCNC: 99 MMOL/L (ref 98–107)
CO2: 28 MMOL/L (ref 20–31)
CREAT SERPL-MCNC: 1 MG/DL (ref 0.7–1.2)
GFR SERPL CREATININE-BSD FRML MDRD: >60 ML/MIN/1.73M2
GLUCOSE BLD-MCNC: 155 MG/DL (ref 70–99)
GLUCOSE BLD-MCNC: 162 MG/DL (ref 75–110)
GLUCOSE BLD-MCNC: 168 MG/DL (ref 75–110)
GLUCOSE BLD-MCNC: 182 MG/DL (ref 75–110)
GLUCOSE BLD-MCNC: 197 MG/DL (ref 75–110)
POTASSIUM SERPL-SCNC: 3.6 MMOL/L (ref 3.7–5.3)
SODIUM BLD-SCNC: 135 MMOL/L (ref 135–144)
SODIUM BLD-SCNC: 136 MMOL/L (ref 135–144)
SODIUM BLD-SCNC: 137 MMOL/L (ref 135–144)
SODIUM BLD-SCNC: 138 MMOL/L (ref 135–144)

## 2022-11-16 PROCEDURE — 84295 ASSAY OF SERUM SODIUM: CPT

## 2022-11-16 PROCEDURE — 6370000000 HC RX 637 (ALT 250 FOR IP): Performed by: NURSE PRACTITIONER

## 2022-11-16 PROCEDURE — 99231 SBSQ HOSP IP/OBS SF/LOW 25: CPT | Performed by: INTERNAL MEDICINE

## 2022-11-16 PROCEDURE — 6370000000 HC RX 637 (ALT 250 FOR IP)

## 2022-11-16 PROCEDURE — 6370000000 HC RX 637 (ALT 250 FOR IP): Performed by: STUDENT IN AN ORGANIZED HEALTH CARE EDUCATION/TRAINING PROGRAM

## 2022-11-16 PROCEDURE — 36415 COLL VENOUS BLD VENIPUNCTURE: CPT

## 2022-11-16 PROCEDURE — 99222 1ST HOSP IP/OBS MODERATE 55: CPT | Performed by: STUDENT IN AN ORGANIZED HEALTH CARE EDUCATION/TRAINING PROGRAM

## 2022-11-16 PROCEDURE — 6360000002 HC RX W HCPCS

## 2022-11-16 PROCEDURE — 2580000003 HC RX 258

## 2022-11-16 PROCEDURE — 2060000000 HC ICU INTERMEDIATE R&B

## 2022-11-16 PROCEDURE — 82947 ASSAY GLUCOSE BLOOD QUANT: CPT

## 2022-11-16 PROCEDURE — 80048 BASIC METABOLIC PNL TOTAL CA: CPT

## 2022-11-16 PROCEDURE — 6370000000 HC RX 637 (ALT 250 FOR IP): Performed by: SURGERY

## 2022-11-16 RX ADMIN — METHOCARBAMOL TABLETS 750 MG: 750 TABLET, COATED ORAL at 02:09

## 2022-11-16 RX ADMIN — SODIUM CHLORIDE, PRESERVATIVE FREE 10 ML: 5 INJECTION INTRAVENOUS at 09:42

## 2022-11-16 RX ADMIN — ACETAMINOPHEN 1000 MG: 500 TABLET ORAL at 13:34

## 2022-11-16 RX ADMIN — OXYCODONE 5 MG: 5 TABLET ORAL at 13:34

## 2022-11-16 RX ADMIN — ENOXAPARIN SODIUM 30 MG: 100 INJECTION SUBCUTANEOUS at 20:39

## 2022-11-16 RX ADMIN — OXYCODONE 5 MG: 5 TABLET ORAL at 09:26

## 2022-11-16 RX ADMIN — EMPAGLIFLOZIN 10 MG: 10 TABLET, FILM COATED ORAL at 09:45

## 2022-11-16 RX ADMIN — NAPROXEN 500 MG: 250 TABLET ORAL at 17:53

## 2022-11-16 RX ADMIN — GABAPENTIN 300 MG: 300 CAPSULE ORAL at 04:22

## 2022-11-16 RX ADMIN — AMLODIPINE BESYLATE 10 MG: 10 TABLET ORAL at 09:26

## 2022-11-16 RX ADMIN — SODIUM CHLORIDE, PRESERVATIVE FREE 20 ML: 5 INJECTION INTRAVENOUS at 20:40

## 2022-11-16 RX ADMIN — METHOCARBAMOL TABLETS 750 MG: 750 TABLET, COATED ORAL at 20:39

## 2022-11-16 RX ADMIN — FAMOTIDINE 20 MG: 20 TABLET, FILM COATED ORAL at 09:26

## 2022-11-16 RX ADMIN — POLYETHYLENE GLYCOL 3350 17 G: 17 POWDER, FOR SOLUTION ORAL at 09:27

## 2022-11-16 RX ADMIN — ACETAMINOPHEN 1000 MG: 500 TABLET ORAL at 20:39

## 2022-11-16 RX ADMIN — GABAPENTIN 300 MG: 300 CAPSULE ORAL at 13:10

## 2022-11-16 RX ADMIN — DESMOPRESSIN ACETATE 40 MG: 0.2 TABLET ORAL at 09:26

## 2022-11-16 RX ADMIN — METHOCARBAMOL TABLETS 750 MG: 750 TABLET, COATED ORAL at 13:35

## 2022-11-16 RX ADMIN — NAPROXEN 500 MG: 250 TABLET ORAL at 09:26

## 2022-11-16 RX ADMIN — SENNOSIDES 8.6 MG: 8.6 TABLET, COATED ORAL at 09:27

## 2022-11-16 RX ADMIN — ENOXAPARIN SODIUM 30 MG: 100 INJECTION SUBCUTANEOUS at 09:27

## 2022-11-16 RX ADMIN — METHOCARBAMOL TABLETS 750 MG: 750 TABLET, COATED ORAL at 09:26

## 2022-11-16 RX ADMIN — FUROSEMIDE 20 MG: 20 TABLET ORAL at 09:26

## 2022-11-16 RX ADMIN — OXYCODONE 5 MG: 5 TABLET ORAL at 02:09

## 2022-11-16 RX ADMIN — ACETAMINOPHEN 1000 MG: 500 TABLET ORAL at 04:22

## 2022-11-16 RX ADMIN — GABAPENTIN 300 MG: 300 CAPSULE ORAL at 20:39

## 2022-11-16 RX ADMIN — OXYCODONE 5 MG: 5 TABLET ORAL at 17:53

## 2022-11-16 RX ADMIN — FAMOTIDINE 20 MG: 20 TABLET, FILM COATED ORAL at 20:39

## 2022-11-16 RX ADMIN — CARVEDILOL 25 MG: 25 TABLET, FILM COATED ORAL at 09:26

## 2022-11-16 RX ADMIN — CARVEDILOL 25 MG: 25 TABLET, FILM COATED ORAL at 17:53

## 2022-11-16 ASSESSMENT — PAIN SCALES - GENERAL
PAINLEVEL_OUTOF10: 7
PAINLEVEL_OUTOF10: 8
PAINLEVEL_OUTOF10: 8
PAINLEVEL_OUTOF10: 7

## 2022-11-16 ASSESSMENT — PAIN DESCRIPTION - DESCRIPTORS: DESCRIPTORS: ACHING

## 2022-11-16 ASSESSMENT — PAIN DESCRIPTION - ORIENTATION
ORIENTATION: LEFT
ORIENTATION: LOWER;LEFT

## 2022-11-16 ASSESSMENT — ENCOUNTER SYMPTOMS
CONSTIPATION: 0
NAUSEA: 0
CHEST TIGHTNESS: 0
SHORTNESS OF BREATH: 0
ABDOMINAL PAIN: 0
VOMITING: 0
BLOOD IN STOOL: 0
WHEEZING: 0
COUGH: 0
DIARRHEA: 0

## 2022-11-16 ASSESSMENT — PAIN DESCRIPTION - LOCATION
LOCATION: ABDOMEN
LOCATION: HIP
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
## 2022-11-16 NOTE — PROGRESS NOTES
PROGRESS NOTE          PATIENT NAME: Kelsey Fragoso  MEDICAL RECORD NO. [id number]  DATE: 2022  SURGEON: Juana Rossi  PRIMARY CARE PHYSICIAN: No primary care provider on file. HD: # 8    ASSESSMENT    Patient Active Problem List   Diagnosis    Multiple closed fractures of pelvis, initial encounter ([facility] [address]    Hypertension    Acetabulum fracture, left ([facility] [address]    Pelvic hematoma in male    Type 2 diabetes mellitus without complication, with long-term current use of insulin (HCC)    Chronic combined systolic and diastolic CHF (congestive heart failure) [facility])       MEDICAL DECISION MAKING AND PLAN       Acute renal injury, resolved              Creatinine normalized                     L inferior and superior pubic rami fxs, L anterior and posterior acetabular wall fxs, L iliac wing fx pelvic hematoma              ORIF left acetabulum and pelvis                           drains x2                                     Activity/ weight bearing status: WBAT RLE, TTWB LLE              PT/OT               MRI L knee 11/10 - chronic changes        DM2 (A1c 8.6)                                                IM consult for DM mgmt                          Jardiance 10mg daily and MDSS     HTN                          Coreg restarted. Resume lisinopril. Hx HTN, HLD, CAD, non ischemic cardiomyopathy, DM, CKD, morbid obesity, methamphetamine abuse         Chief Complaint: \"I need to have a bowel movement\"    SUBJECTIVE    Kelsey Fragoso is is unchanged since yesterday. Patient states he feels well. Is passing gas but states he hasn't had a bowel movement in a couple days. States he doesn't want extra medication to help have a BM. Patient denies paresthesias, weakness.  Is urinating without difficulty      OBJECTIVE  VITALS: Temp: Temp: 98 °F (36.7 °C)Temp  Av.2 °F (36.8 °C)  Min: 97.5 °F (36.4 °C)  Max: 98.8 °F (93.0 °C) BP Systolic (68WSN), IOX:715 , Min:104 , UVD:117   Diastolic (14LIJ), WYA:59, Min:53, Max:86 Pulse Pulse  Av.2  Min: 72  Max: 81 Resp Resp  Av.3  Min: 10  Max: 13 Pulse ox SpO2  Av.6 %  Min: 92 %  Max: 95 %  GENERAL: alert, no distress  NEURO: sensation and strength intact distally  HEENT: Normocephalic, atraumatic  LUNGS: no increased respiraotry effort  HEART: normal rate and regular rhythm  ABDOMEN: soft, non-tender, non-distended, and no guarding or peritoneal signs present  EXTREMITY: no cyanosis, clubbing or edema    I/O last 3 completed shifts: In: 3943 [P.O.:4750]  Out: 4200 [Urine:4200]    Drain/tube output: In: 3600 [P.O.:3600]  Out: 2099 [Urine:5750]    LAB:  CBC:   Recent Labs     22  0510 11/15/22  0513   WBC 11.7* 10.3   HGB 8.9* 8.4*   HCT 28.4* 27.4*   MCV 87.9 91.0    375     BMP:   Recent Labs     22  0510 11/15/22  0513 11/15/22  1510 11/15/22  2159 22  0419    128* 136 136 135   K 4.2 3.9  --   --  3.6*    95*  --   --  99   CO2 25 25  --   --  28   BUN 18 20  --   --  24*   CREATININE 0.84 0.80  --   --  1.00   GLUCOSE 112* 132*  --   --  155*     COAGS: No results for input(s): APTT, PROT, INR in the last 72 hours. RADIOLOGY:  No new imaging      Elaina Love DO  22, 6:57 AM      Attestation signed by Jaycob Gilbert MD    I personally evaluated the patient and directed the medical decision making with Resident/EARL after the physical/radiologic exam and laboratory values were reviewed and confirmed.       Jaycob Gilbert MD

## 2022-11-16 NOTE — PLAN OF CARE
Problem: Discharge Planning  Goal: Discharge to home or other facility with appropriate resources  11/16/2022 0119 by Heide Ravi RN  Outcome: Progressing  11/15/2022 1811 by Andrzej Hurtado RN  Outcome: Progressing     Problem: Pain  Goal: Verbalizes/displays adequate comfort level or baseline comfort level  11/16/2022 0119 by Heide Ravi RN  Outcome: Progressing  11/15/2022 1811 by Andrzej Hurtado RN  Outcome: Progressing     Problem: Cardiovascular - Adult  Goal: Maintains optimal cardiac output and hemodynamic stability  11/16/2022 0119 by Heide Ravi RN  Outcome: Progressing  11/15/2022 1811 by Andrzej Hurtado RN  Outcome: Progressing  Goal: Absence of cardiac dysrhythmias or at baseline  11/16/2022 0119 by Heide Ravi RN  Outcome: Progressing  11/15/2022 1811 by Andrzej Hurtado RN  Outcome: Progressing     Problem: Skin/Tissue Integrity - Adult  Goal: Skin integrity remains intact  11/16/2022 0119 by Heide Ravi RN  Outcome: Progressing  11/15/2022 1811 by Andrzej Hurtado RN  Outcome: Progressing  Goal: Incisions, wounds, or drain sites healing without S/S of infection  11/16/2022 0119 by Heide Ravi RN  Outcome: Progressing  11/15/2022 1811 by Andrzej Hurtado RN  Outcome: Progressing  Goal: Oral mucous membranes remain intact  11/16/2022 0119 by Heide Ravi RN  Outcome: Progressing  11/15/2022 1811 by Andrzej Hurtado RN  Outcome: Progressing     Problem: Musculoskeletal - Adult  Goal: Return mobility to safest level of function  11/16/2022 0119 by Heide Ravi RN  Outcome: Progressing  11/15/2022 1811 by Andrzej Hurtado RN  Outcome: Progressing  Goal: Maintain proper alignment of affected body part  11/16/2022 0119 by Heide Ravi RN  Outcome: Progressing  11/15/2022 1811 by Andrzej Hurtado RN  Outcome: Progressing  Goal: Return ADL status to a safe level of function  11/16/2022 0119 by Heide Ravi RN  Outcome: Progressing  11/15/2022 1811 by Eugenia Oren, RN  Outcome: Progressing     Problem: Skin/Tissue Integrity  Goal: Absence of new skin breakdown  Description: 1. Monitor for areas of redness and/or skin breakdown  2. Assess vascular access sites hourly  3. Every 4-6 hours minimum:  Change oxygen saturation probe site  4. Every 4-6 hours:  If on nasal continuous positive airway pressure, respiratory therapy assess nares and determine need for appliance change or resting period.   11/16/2022 0119 by Hamzah Crenshaw RN  Outcome: Progressing  11/15/2022 1811 by Eugenia Lott RN  Outcome: Progressing     Problem: ABCDS Injury Assessment  Goal: Absence of physical injury  11/16/2022 0119 by Hamzah Crenshaw RN  Outcome: Progressing  11/15/2022 1811 by Eugenia Lott RN  Outcome: Progressing     Problem: Safety - Adult  Goal: Free from fall injury  11/16/2022 0119 by Hamzah Crenshaw RN  Outcome: Progressing  11/15/2022 1811 by Eugenia Lott RN  Outcome: Progressing     Problem: Chronic Conditions and Co-morbidities  Goal: Patient's chronic conditions and co-morbidity symptoms are monitored and maintained or improved  11/16/2022 0119 by Hamzah Crenshaw RN  Outcome: Progressing  11/15/2022 1811 by Eugenia Lott RN  Outcome: Progressing     Problem: Nutrition Deficit:  Goal: Optimize nutritional status  11/16/2022 0119 by Hamzah Crenshaw RN  Outcome: Progressing  11/15/2022 1811 by Eugenia Lott RN  Outcome: Progressing

## 2022-11-16 NOTE — CARE COORDINATION
TRANSITIONAL CARE PLANNING/ 2 Rehab Dimitri Day: 8    Reason for Admission: Multiple closed fractures of pelvis, initial encounter [facility]) [S32.82XA]         Readmission Risk              Risk of Unplanned Readmission:  13            Patient goals/Treatment Preferences/Transitional Plan: PT/OT notes reviewed, met with patient discussed options. [facility]    # [phone]    16:25 juan c [phone] called from [facility] asking about auto insurance patient said he doesn't have any.    Renée Estrada will submit for precert through [facility]

## 2022-11-16 NOTE — PROGRESS NOTES
Dr Shalom Barton notified via secure services patient c/o constipation routine and PRN bowel management given and not effective   .
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
## 2022-11-17 VITALS
TEMPERATURE: 98.5 F | SYSTOLIC BLOOD PRESSURE: 141 MMHG | OXYGEN SATURATION: 98 % | BODY MASS INDEX: 40.12 KG/M2 | DIASTOLIC BLOOD PRESSURE: 84 MMHG | HEART RATE: 70 BPM | RESPIRATION RATE: 12 BRPM | WEIGHT: 286.6 LBS | HEIGHT: 71 IN
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
## 2022-11-17 LAB
GLUCOSE BLD-MCNC: 178 MG/DL (ref 75–110)
GLUCOSE BLD-MCNC: 196 MG/DL (ref 75–110)
SODIUM BLD-SCNC: 133 MMOL/L (ref 135–144)
SODIUM BLD-SCNC: 138 MMOL/L (ref 135–144)

## 2022-11-17 PROCEDURE — 84295 ASSAY OF SERUM SODIUM: CPT

## 2022-11-17 PROCEDURE — 6360000002 HC RX W HCPCS

## 2022-11-17 PROCEDURE — 6370000000 HC RX 637 (ALT 250 FOR IP): Performed by: NURSE PRACTITIONER

## 2022-11-17 PROCEDURE — 2580000003 HC RX 258

## 2022-11-17 PROCEDURE — 6370000000 HC RX 637 (ALT 250 FOR IP)

## 2022-11-17 PROCEDURE — 97535 SELF CARE MNGMENT TRAINING: CPT

## 2022-11-17 PROCEDURE — 36415 COLL VENOUS BLD VENIPUNCTURE: CPT

## 2022-11-17 PROCEDURE — 94761 N-INVAS EAR/PLS OXIMETRY MLT: CPT

## 2022-11-17 PROCEDURE — 82947 ASSAY GLUCOSE BLOOD QUANT: CPT

## 2022-11-17 PROCEDURE — 6370000000 HC RX 637 (ALT 250 FOR IP): Performed by: STUDENT IN AN ORGANIZED HEALTH CARE EDUCATION/TRAINING PROGRAM

## 2022-11-17 PROCEDURE — 97116 GAIT TRAINING THERAPY: CPT

## 2022-11-17 PROCEDURE — 99232 SBSQ HOSP IP/OBS MODERATE 35: CPT | Performed by: INTERNAL MEDICINE

## 2022-11-17 PROCEDURE — 97110 THERAPEUTIC EXERCISES: CPT

## 2022-11-17 PROCEDURE — 6370000000 HC RX 637 (ALT 250 FOR IP): Performed by: INTERNAL MEDICINE

## 2022-11-17 RX ORDER — SPIRONOLACTONE 25 MG/1
25 TABLET ORAL DAILY
Status: DISCONTINUED | OUTPATIENT
Start: 2022-11-17 | End: 2022-11-17 | Stop reason: HOSPADM

## 2022-11-17 RX ORDER — CARVEDILOL 12.5 MG/1
12.5 TABLET ORAL 2 TIMES DAILY WITH MEALS
Status: DISCONTINUED | OUTPATIENT
Start: 2022-11-17 | End: 2022-11-17 | Stop reason: HOSPADM

## 2022-11-17 RX ORDER — QUETIAPINE FUMARATE 25 MG/1
12.5 TABLET, FILM COATED ORAL EVERY EVENING
Status: DISCONTINUED | OUTPATIENT
Start: 2022-11-17 | End: 2022-11-17 | Stop reason: HOSPADM

## 2022-11-17 RX ORDER — OXYCODONE HYDROCHLORIDE 5 MG/1
5 TABLET ORAL EVERY 6 HOURS PRN
Status: DISCONTINUED | OUTPATIENT
Start: 2022-11-17 | End: 2022-11-17 | Stop reason: HOSPADM

## 2022-11-17 RX ADMIN — FUROSEMIDE 20 MG: 20 TABLET ORAL at 09:23

## 2022-11-17 RX ADMIN — EMPAGLIFLOZIN 10 MG: 10 TABLET, FILM COATED ORAL at 09:23

## 2022-11-17 RX ADMIN — FAMOTIDINE 20 MG: 20 TABLET, FILM COATED ORAL at 09:23

## 2022-11-17 RX ADMIN — NAPROXEN 500 MG: 250 TABLET ORAL at 09:23

## 2022-11-17 RX ADMIN — POLYETHYLENE GLYCOL 3350 17 G: 17 POWDER, FOR SOLUTION ORAL at 11:02

## 2022-11-17 RX ADMIN — ACETAMINOPHEN 1000 MG: 500 TABLET ORAL at 05:20

## 2022-11-17 RX ADMIN — Medication 5 MG: at 00:11

## 2022-11-17 RX ADMIN — OXYCODONE 5 MG: 5 TABLET ORAL at 04:38

## 2022-11-17 RX ADMIN — METHOCARBAMOL TABLETS 750 MG: 750 TABLET, COATED ORAL at 09:23

## 2022-11-17 RX ADMIN — LISINOPRIL 20 MG: 20 TABLET ORAL at 11:02

## 2022-11-17 RX ADMIN — ENOXAPARIN SODIUM 30 MG: 100 INJECTION SUBCUTANEOUS at 09:22

## 2022-11-17 RX ADMIN — ACETAMINOPHEN 1000 MG: 500 TABLET ORAL at 13:07

## 2022-11-17 RX ADMIN — AMLODIPINE BESYLATE 10 MG: 10 TABLET ORAL at 09:23

## 2022-11-17 RX ADMIN — METHOCARBAMOL TABLETS 750 MG: 750 TABLET, COATED ORAL at 13:07

## 2022-11-17 RX ADMIN — OXYCODONE 5 MG: 5 TABLET ORAL at 00:11

## 2022-11-17 RX ADMIN — DESMOPRESSIN ACETATE 40 MG: 0.2 TABLET ORAL at 09:23

## 2022-11-17 RX ADMIN — METHOCARBAMOL TABLETS 750 MG: 750 TABLET, COATED ORAL at 03:29

## 2022-11-17 RX ADMIN — GABAPENTIN 300 MG: 300 CAPSULE ORAL at 03:29

## 2022-11-17 RX ADMIN — SPIRONOLACTONE 25 MG: 25 TABLET ORAL at 11:03

## 2022-11-17 RX ADMIN — SODIUM CHLORIDE, PRESERVATIVE FREE 10 ML: 5 INJECTION INTRAVENOUS at 09:26

## 2022-11-17 RX ADMIN — OXYCODONE 5 MG: 5 TABLET ORAL at 13:07

## 2022-11-17 ASSESSMENT — PAIN DESCRIPTION - ORIENTATION
ORIENTATION: LEFT;LOWER

## 2022-11-17 ASSESSMENT — ENCOUNTER SYMPTOMS
CHEST TIGHTNESS: 0
BLOOD IN STOOL: 0
ABDOMINAL PAIN: 0
NAUSEA: 0
WHEEZING: 0
DIARRHEA: 0
CONSTIPATION: 0
SHORTNESS OF BREATH: 0
COUGH: 0
VOMITING: 0
BLURRED VISION: 1

## 2022-11-17 ASSESSMENT — PAIN SCALES - GENERAL
PAINLEVEL_OUTOF10: 7
PAINLEVEL_OUTOF10: 5
PAINLEVEL_OUTOF10: 9
PAINLEVEL_OUTOF10: 7
PAINLEVEL_OUTOF10: 3
PAINLEVEL_OUTOF10: 6
PAINLEVEL_OUTOF10: 7

## 2022-11-17 ASSESSMENT — PAIN DESCRIPTION - DESCRIPTORS: DESCRIPTORS: ACHING

## 2022-11-17 ASSESSMENT — PAIN DESCRIPTION - LOCATION
LOCATION: ABDOMEN
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
## 2022-11-17 NOTE — CARE COORDINATION
Transitional planning      Call placed to [facility], [facility] with [facility] for call back. 1140 call from [place] , can accept patient today, provided number for nurse to call report [phone], fax AVS to [phone], transport request faxed to [address] for 1600.          1200 call from Liliam Barajas with [facility], transport arranged for [phone], primary RN and patient updated, call to [place], left vm with time. 1230 call back from [place], ok to come at 1330, AVS faxed .
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
## 2022-11-17 NOTE — PROGRESS NOTES
Pt bathe. PIV removed. Personal belongings packed up. RN attempted to call report to [facility]. They stated they will call back, RN made them aware that patient is leaving the hospital now.

## 2022-11-17 NOTE — PROGRESS NOTES
Pt put on call light. RN asked pt what he needed. Pt stated he was \"in pain and uncomfortable\". RN asked pt what was uncomfortable and how to help, pt raised his voice \"what part of I'm uncomfortable don't you understand? \" RN continued to try to understand how pt would like to be repositioned. Pt told RN he was in pain and RN reminded him he was just given pain meds. Pillowed were removed from L hip and an ice pack was applied.

## 2022-11-17 NOTE — PROGRESS NOTES
Physician Progress Note      PATIENT:               Rajan Eason  CSN #:                  [id number]  :                       1970  ADMIT DATE:       2022 9:12 PM  DISCH DATE:  RESPONDING  PROVIDER #:        Adamaris Magana        QUERY TEXT:    Stage of Chronic Kidney Disease: Please provide further specificity, if known. Clinical indicators include: ckd, ckd (chronic kidney disease, bun,   creatinine, bnp  Options provided:  -- Chronic kidney disease stage 1  -- Chronic kidney disease stage 2  -- Chronic kidney disease stage 3  -- Chronic kidney disease stage 3a  -- Chronic kidney disease stage 3b  -- Chronic kidney disease stage 4  -- Chronic kidney disease stage 5  -- Chronic kidney disease stage 5, requiring dialysis  -- End stage renal disease  -- Other - I will add my own diagnosis  -- Disagree - Not applicable / Not valid  -- Disagree - Clinically Unable to determine / Unknown        PROVIDER RESPONSE TEXT:    Provider was unable to determine a response for this query.   unknown      Electronically signed by:  Adamaris Magana 2022 7:03 AM

## 2022-11-17 NOTE — PROGRESS NOTES
Pt stated he need changed to aide. RN offered to change pt with aide now or later. Pt declined and said to change him later. Pt then asked for more pain medication, yet last administered dose was less than an hour ago.

## 2022-11-17 NOTE — PLAN OF CARE
Problem: Pain  Goal: Verbalizes/displays adequate comfort level or baseline comfort level  11/17/2022 0244 by Emeli Valencia RN  Outcome: Progressing     Problem: Musculoskeletal - Adult  Goal: Return ADL status to a safe level of function  11/17/2022 0244 by Emeli Valencia RN  Outcome: Progressing

## 2022-11-17 NOTE — CARE COORDINATION
Discharge [address] Case Management Department  Written by: Ciro Ferrara RN    Patient Name: Xuan Chau  Attending Provider: Adilene Cao MD  Admit Date: 2022  9:12 PM  MRN: [id number]  Account: [de-identified]                     : 1970  Discharge Date: 22      Disposition: [facility]    Ciro Ferrara RN

## 2022-11-17 NOTE — PROGRESS NOTES
Pt put call light on. Pt states \"I am confused\". RN went to go assess pt. Pt is able to answer all orientation questions correctly, and then follows the correct answers with Miri cramer in a video game? \" and \"I'll play along if want. \"    Pt was repositioned and pillows were retucked under pt.

## 2022-11-22 ENCOUNTER — HOSPITAL ENCOUNTER (OUTPATIENT)
Age: 52
Setting detail: SPECIMEN
Discharge: HOME | End: 2022-11-22

## 2022-11-22 ENCOUNTER — TELEPHONE (OUTPATIENT)
Dept: ORTHOPEDIC SURGERY | Age: 52
End: 2022-11-22

## 2022-11-22 LAB
ANION GAP SERPL CALCULATED.3IONS-SCNC: 9 MMOL/L (ref 9–17)
BUN BLDV-MCNC: 20 MG/DL (ref 6–20)
BUN/CREAT BLD: 19 (ref 9–20)
CALCIUM SERPL-MCNC: 8.8 MG/DL (ref 8.6–10.4)
CHLORIDE BLD-SCNC: 102 MMOL/L (ref 98–107)
CO2: 28 MMOL/L (ref 20–31)
CREAT SERPL-MCNC: 1.06 MG/DL (ref 0.7–1.2)
GFR SERPL CREATININE-BSD FRML MDRD: >60 ML/MIN/1.73M2
GLUCOSE BLD-MCNC: 211 MG/DL (ref 70–99)
HCT VFR BLD CALC: 27 % (ref 40.7–50.3)
HEMOGLOBIN: 7.7 G/DL (ref 13–17)
MCH RBC QN AUTO: 26.8 PG (ref 25.2–33.5)
MCHC RBC AUTO-ENTMCNC: 28.5 G/DL (ref 28.4–34.8)
MCV RBC AUTO: 94.1 FL (ref 82.6–102.9)
NRBC AUTOMATED: 0.3 PER 100 WBC
PDW BLD-RTO: 13.8 % (ref 11.8–14.4)
PLATELET # BLD: 591 K/UL (ref 138–453)
PMV BLD AUTO: 9.9 FL (ref 8.1–13.5)
POTASSIUM SERPL-SCNC: 4.4 MMOL/L (ref 3.7–5.3)
RBC # BLD: 2.87 M/UL (ref 4.21–5.77)
SODIUM BLD-SCNC: 139 MMOL/L (ref 135–144)
WBC # BLD: 13.9 K/UL (ref 3.5–11.3)

## 2022-11-22 PROCEDURE — P9603 ONE-WAY ALLOW PRORATED MILES: HCPCS

## 2022-11-22 PROCEDURE — 85027 COMPLETE CBC AUTOMATED: CPT

## 2022-11-22 PROCEDURE — 80048 BASIC METABOLIC PNL TOTAL CA: CPT

## 2022-11-22 PROCEDURE — 36415 COLL VENOUS BLD VENIPUNCTURE: CPT

## 2022-11-29 ENCOUNTER — HOSPITAL ENCOUNTER (OUTPATIENT)
Age: 52
Setting detail: SPECIMEN
Discharge: HOME | End: 2022-11-29

## 2022-11-29 LAB
ANION GAP SERPL CALCULATED.3IONS-SCNC: 10 MMOL/L (ref 9–17)
BUN BLDV-MCNC: 17 MG/DL (ref 6–20)
BUN/CREAT BLD: 16 (ref 9–20)
CALCIUM SERPL-MCNC: 8.8 MG/DL (ref 8.6–10.4)
CHLORIDE BLD-SCNC: 104 MMOL/L (ref 98–107)
CO2: 26 MMOL/L (ref 20–31)
CREAT SERPL-MCNC: 1.08 MG/DL (ref 0.7–1.2)
GFR SERPL CREATININE-BSD FRML MDRD: >60 ML/MIN/1.73M2
GLUCOSE BLD-MCNC: 187 MG/DL (ref 70–99)
HCT VFR BLD CALC: 28.2 % (ref 40.7–50.3)
HEMOGLOBIN: 8 G/DL (ref 13–17)
MCH RBC QN AUTO: 26.1 PG (ref 25.2–33.5)
MCHC RBC AUTO-ENTMCNC: 28.4 G/DL (ref 28.4–34.8)
MCV RBC AUTO: 91.9 FL (ref 82.6–102.9)
NRBC AUTOMATED: 0 PER 100 WBC
PDW BLD-RTO: 14.3 % (ref 11.8–14.4)
PLATELET # BLD: 558 K/UL (ref 138–453)
PMV BLD AUTO: 9.7 FL (ref 8.1–13.5)
POTASSIUM SERPL-SCNC: 4 MMOL/L (ref 3.7–5.3)
RBC # BLD: 3.07 M/UL (ref 4.21–5.77)
SODIUM BLD-SCNC: 140 MMOL/L (ref 135–144)
WBC # BLD: 7.4 K/UL (ref 3.5–11.3)

## 2022-11-29 PROCEDURE — 85027 COMPLETE CBC AUTOMATED: CPT

## 2022-11-29 PROCEDURE — P9603 ONE-WAY ALLOW PRORATED MILES: HCPCS

## 2022-11-29 PROCEDURE — 80048 BASIC METABOLIC PNL TOTAL CA: CPT

## 2022-11-29 PROCEDURE — 36415 COLL VENOUS BLD VENIPUNCTURE: CPT
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
## 2022-12-01 ENCOUNTER — TELEPHONE (OUTPATIENT)
Dept: ORTHOPEDIC SURGERY | Age: 52
End: 2022-12-01

## 2022-12-01 NOTE — TELEPHONE ENCOUNTER
Hector Arguello from [facility] is requesting a call from clinical staff to go over pts weight bearing status.  Please contact teresa at [phone]

## 2022-12-01 NOTE — TELEPHONE ENCOUNTER
José Miugel Mary from [facility] called with questions about weight bearing status. Advised her of patient weight bearing status from surgery and patient will be assessed at f/u appointment. Also spoke with nursing staff about removing perla.  Suleman Rivera will be removed at f/u appointment at 12/6/22

## 2022-12-06 ENCOUNTER — OFFICE VISIT (OUTPATIENT)
Dept: ORTHOPEDIC SURGERY | Age: 52
End: 2022-12-06

## 2022-12-06 VITALS — WEIGHT: 286 LBS | HEIGHT: 71 IN | BODY MASS INDEX: 40.04 KG/M2

## 2022-12-06 DIAGNOSIS — R52 PAIN: ICD-10-CM

## 2022-12-06 DIAGNOSIS — S32.402D CLOSED DISPLACED FRACTURE OF LEFT ACETABULUM WITH ROUTINE HEALING, UNSPECIFIED PORTION OF ACETABULUM, SUBSEQUENT ENCOUNTER: Primary | ICD-10-CM

## 2022-12-06 PROCEDURE — 99024 POSTOP FOLLOW-UP VISIT: CPT | Performed by: ORTHOPAEDIC SURGERY
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
## 2022-12-10 ENCOUNTER — TELEPHONE (OUTPATIENT)
Dept: OTHER | Age: 52
End: 2022-12-10

## 2022-12-10 NOTE — TELEPHONE ENCOUNTER
Patient has recent injury post hip surgery. Had follow up visit on Tuesday with provider Dr. Brittney Suárez aand was to have ordered Oxycodone 10 mg. ER. [address] [facility] in [place]. Is out of the medication. Dtr. requesting replacement medication as patient is having a significant amount of pain. Writer contacted provider and is currently unavailable but will call back.   FRANCISCO KRAMER.

## 2022-12-12 ENCOUNTER — TELEPHONE (OUTPATIENT)
Dept: ORTHOPEDIC SURGERY | Age: 52
End: 2022-12-12

## 2022-12-12 DIAGNOSIS — S32.402D CLOSED DISPLACED FRACTURE OF LEFT ACETABULUM WITH ROUTINE HEALING, UNSPECIFIED PORTION OF ACETABULUM, SUBSEQUENT ENCOUNTER: ICD-10-CM

## 2022-12-12 DIAGNOSIS — R52 PAIN: Primary | ICD-10-CM

## 2022-12-12 RX ORDER — OXYCODONE HYDROCHLORIDE 5 MG/1
5 TABLET ORAL EVERY 6 HOURS PRN
Qty: 28 TABLET | Refills: 0 | Status: SHIPPED | OUTPATIENT
Start: 2022-12-12 | End: 2022-12-19

## 2022-12-12 NOTE — TELEPHONE ENCOUNTER
Pts brother called in stating [facility] in [place] was unable to refill pts medication due to the medication is not in stock.  oxyCODONE (ROXICODONE) 5 MG.please contact pts brother at  [phone]      11/9 [facility] omayra

## 2022-12-12 NOTE — TELEPHONE ENCOUNTER
11/9/2022 OPEN REDUCTION INTERNAL FIXATION ACETABULUM AND PELVIC RING    Patient was in facility initially. He was written an order at discharge for OXY ER 10mg. The [facility] does not carry that medication. Patient is requesting anything we can give him for pain as he is in so much pain since Friday. Please Advise.

## 2023-01-05 DIAGNOSIS — S32.402D CLOSED DISPLACED FRACTURE OF LEFT ACETABULUM WITH ROUTINE HEALING, UNSPECIFIED PORTION OF ACETABULUM, SUBSEQUENT ENCOUNTER: ICD-10-CM

## 2023-01-05 RX ORDER — OXYCODONE HYDROCHLORIDE 5 MG/1
5 TABLET ORAL EVERY 6 HOURS PRN
Qty: 21 TABLET | Refills: 0 | Status: SHIPPED | OUTPATIENT
Start: 2023-01-05 | End: 2023-01-12

## 2023-01-05 NOTE — TELEPHONE ENCOUNTER
Patient is requesting a refill of pain medication, noted his pain has been keeping him up at night. Please advise.

## 2023-01-17 ENCOUNTER — OFFICE VISIT (OUTPATIENT)
Dept: ORTHOPEDIC SURGERY | Age: 53
End: 2023-01-17

## 2023-01-17 ENCOUNTER — HOSPITAL ENCOUNTER (OUTPATIENT)
Dept: CT IMAGING | Age: 53
Discharge: HOME | End: 2023-01-19
Payer: COMMERCIAL

## 2023-01-17 VITALS — WEIGHT: 286 LBS | HEIGHT: 71 IN | BODY MASS INDEX: 40.04 KG/M2

## 2023-01-17 DIAGNOSIS — S32.402D CLOSED DISPLACED FRACTURE OF LEFT ACETABULUM WITH ROUTINE HEALING, UNSPECIFIED PORTION OF ACETABULUM, SUBSEQUENT ENCOUNTER: ICD-10-CM

## 2023-01-17 DIAGNOSIS — S32.402D CLOSED DISPLACED FRACTURE OF LEFT ACETABULUM WITH ROUTINE HEALING, UNSPECIFIED PORTION OF ACETABULUM, SUBSEQUENT ENCOUNTER: Primary | ICD-10-CM

## 2023-01-17 PROCEDURE — 72192 CT PELVIS W/O DYE: CPT

## 2023-01-17 PROCEDURE — 99024 POSTOP FOLLOW-UP VISIT: CPT | Performed by: ORTHOPAEDIC SURGERY

## 2023-01-17 RX ORDER — OXYCODONE HYDROCHLORIDE 5 MG/1
5 TABLET ORAL EVERY 6 HOURS PRN
Qty: 21 TABLET | Refills: 0 | Status: CANCELLED | OUTPATIENT
Start: 2023-01-17 | End: 2023-01-24

## 2023-01-19 ENCOUNTER — OFFICE VISIT (OUTPATIENT)
Dept: ORTHOPEDIC SURGERY | Age: 53
End: 2023-01-19

## 2023-01-19 VITALS — HEIGHT: 71 IN | BODY MASS INDEX: 40.04 KG/M2 | WEIGHT: 286 LBS

## 2023-01-19 DIAGNOSIS — S32.402D CLOSED DISPLACED FRACTURE OF LEFT ACETABULUM WITH ROUTINE HEALING, UNSPECIFIED PORTION OF ACETABULUM, SUBSEQUENT ENCOUNTER: Primary | ICD-10-CM

## 2023-01-19 PROCEDURE — 99024 POSTOP FOLLOW-UP VISIT: CPT | Performed by: ORTHOPAEDIC SURGERY

## 2023-01-19 RX ORDER — GABAPENTIN 100 MG/1
100 CAPSULE ORAL 3 TIMES DAILY
Qty: 90 CAPSULE | Refills: 0 | Status: SHIPPED | OUTPATIENT
Start: 2023-01-19 | End: 2023-02-18

## 2023-01-19 RX ORDER — OXYCODONE HYDROCHLORIDE AND ACETAMINOPHEN 5; 325 MG/1; MG/1
1-2 TABLET ORAL EVERY 6 HOURS PRN
Qty: 56 TABLET | Refills: 0 | Status: SHIPPED | OUTPATIENT
Start: 2023-01-19 | End: 2023-01-19

## 2023-01-19 RX ORDER — OXYCODONE HYDROCHLORIDE AND ACETAMINOPHEN 5; 325 MG/1; MG/1
1-2 TABLET ORAL EVERY 6 HOURS PRN
Qty: 56 TABLET | Refills: 0 | Status: SHIPPED | OUTPATIENT
Start: 2023-01-19 | End: 2023-01-26

## 2023-01-19 RX ORDER — CYCLOBENZAPRINE HCL 10 MG
10 TABLET ORAL 3 TIMES DAILY PRN
Qty: 50 TABLET | Refills: 0 | Status: SHIPPED | OUTPATIENT
Start: 2023-01-19

## 2023-01-19 NOTE — PROGRESS NOTES
[address] [facility]  [address] [facility] [address]  Dept: [phone]  Dept Fax: [phone]        Orthopaedic Trauma Clinic Follow Up      Subjective:   Date of Surgery: 11/9/22    Jose Antonio Guillen is a 45y.o. year old male who presents to the clinic today for routine 2 month followup regarding his left acetabular fracture s/p ORIF for discussion of his CT pelvis imaging results. At last visit, fracture of the left iliac wing was identified on x-ray imaging. We sent him for new x-ray imaging pelvis and for a CT pelvis to further identify the fracture. When reviewing the imaging there does appear to be healing, callous formation seen around the fracture site. Patient states no change in pain, functional level from last visit on Tuesday. He continues to be utilizing the rolling walker for ambulation. He continues to have some \"pins-and-needles\" feelings in the left thigh as well. He states he is unable to sleep due to inability of finding a comfortable position. He used to be on trazodone through his primary care doctor. Patient is mainly concerned if we have to perform another surgery today to retrieve the screws, or surgically fix the iliac wing fracture. Denies any new trauma or falls since Tuesday. Review of Systems  Gen: no fever, chills, malaise  CV: no chest pain or palpitations  Resp: no cough or shortness of breath  GI: no nausea, vomiting, diarrhea, or constipation  Neuro: Numbness, tingling in left thigh  Msk: Left hip pain  10 remaining systems reviewed and negative    Objective : There were no vitals filed for this visit. Body mass index is 39.89 kg/m². General: No acute distress, resting comfortably in the clinic  Neuro: Alert, oriented  Eyes: Extra-ocular muscles intact  Pulm: Respirations unlabored and regular.   Skin: Warm, well perfused  Psych: Patient has good fund of knowledge and displays understanging of exam, diagnosis, and plan. MSK-LLE: Skin intact. Surgical incisions are healing well. Patient comfortably sitting in chair at this time. Patient was seen earlier in the office ambulating on the left leg with rolling walker to the restroom. Patient is tender to palpation over the iliac crest, ASIS, directly around the incision. No significant edema, ecchymoses seen. Is able to minimally flex hip without significant pain. Compartments soft and compressible. Motor and sensation intact L3-S1. TA/EHL/FHL/GS motor intact. Deep and Superficial Peroneal/Saphenous/Sural SILT. 2+ DP pulse. Radiology:  No new imaging taken at this encounter. CT Pelvis 1/17/23:   Prior internal fixation of a left acetabular fracture with change in position   of the fracture. A bone fragment related to the anterior aspect of the   acetabulum and lateral aspect of the ileum has migrated anteriorly with as   much as 1.7 cm of diastasis of the fracture fragments. There has also been   little interval healing of the fracture since the prior study. Assessment:   45y.o. year old male with acetabular fracture status post ORIF, DOS: 11/9/22, Iliac wing fracture DOI: 01/2022    Plan: Today extensive discussion was had with the patient and his daughter regarding the fracture found in the left iliac wing. We advised the patient to attempt to be toe-touch weightbearing on the left side as much as he can. We do understand that he has difficulty due to his living status at this time. We also advised him to continue with his diabetes care and to keep his blood sugar within acceptable limits per his primary care doctor. We discussed today the 2 screws that appear to be changed in alignment from original fixation. We advised him that we will follow-up with him in 6 weeks to see if the iliac wing is healing and if his left hip pain resolves once that fracture heals.   We explained surgical versus nonsurgical treatment options for this fracture, and patient and his daughter are in agreement that we will proceed with nonoperative treatment at this time. We will have him be toe-touch weightbearing on the left side for 6 weeks, where he will return to the office for repeat x-rays and reevaluation. We sent prescriptions for gabapentin, Percocet, Flexeril to his pharmacy today. Patient states he is following with his primary care next week for evaluation and medication update. And his daughter understand the current plan and are in agreement. Negin Esteban DO  Orthopedic Surgery Resident, PGY-1  [address] [facility]

## 2023-01-20 ENCOUNTER — TELEPHONE (OUTPATIENT)
Dept: ORTHOPEDIC SURGERY | Age: 53
End: 2023-01-20

## 2023-01-20 NOTE — TELEPHONE ENCOUNTER
Jillene Duane from [address] [place] [address] called and stated that they need [place] number for gabapentin that was prescribed.

## 2023-02-19 NOTE — PROGRESS NOTES
[facility]  [address] [facility] [address] [facility]  Dept Phone: [phone]  Dept Fax: [phone]      Orthopaedic Trauma Clinic Follow Up      Subjective:   Date of Surgery: 11/9/2022    Domitila Beach is a 45y.o. year old male who presents to the clinic today for routine follow up approximately 10 weeks from undergoing ORIF of left acetabulum fracture. Patient reports continued pain in the left groin and left flank area. He is here with a rolling walker. When asked about compliance with toe-touch weightbearing restrictions he gives inconsistent answers. He is accompanied by his son who reported that the patient has been ambulating independently around the home intermittently and knowingly noncompliant with his weightbearing restrictions. He did have a fall while at the facility following discharge from the hospital but states that since being home he has had no further trauma. Review of Systems  Gen: no fever, chills, malaise  CV: no chest pain or palpitations  Resp: no cough or shortness of breath  GI: no nausea, vomiting, diarrhea, or constipation  Neuro: no seizures, vertigo, or headache  Msk: Left groin and hip pain  10 remaining systems reviewed and negative    Objective : There were no vitals filed for this visit. Body mass index is 39.89 kg/m². General: No acute distress, resting comfortably in the clinic  Neuro: alert. oriented  Eyes: Extra-ocular muscles intact  Pulm: Respirations unlabored and regular. Skin: warm, well perfused  Psych:   Patient has good fund of knowledge and displays understanding of exam, diagnosis, and plan. MSK: LLE: Surgical incisions well-healed. Pain with active and passive movement of the leg. Significant difficulty with active hip flexion. compartments soft. 2+ DP/PT pulses with BCR. TA/EHL/FHL/GS motor intact. Saph/Latrell/DP/SP nerves SILT    Radiology:  Imaging studies from today were independently reviewed and read as listed below. Any relevant images obtained prior to today's visit were also independently interpreted. History: Left acetabular fracture status post ORIF on 11/9/2022    Comparison: 12/6/2022    Findings: 3 views of the pelvis (AP, OO Judet, and IO Judet) in a skeletally mature patient showing evidence of prior left acetabulum fracture and retained plate and screw construct. The joint remains congruently aligned on all views however there is identification of a subacute fracture line through the anterior column that is posterior to the initial fracture line present at time of injury. Upon a further review of this line was not present on preoperative or initial postoperative radiographs however it can be identified on his 4-week postoperative office radiographs although not detected radiology report. One of the posterior column lag screws has begun to back out but all other remaining implants remain intact. Impression: Left acetabular fracture status post ORIF with early implant failure and new fracture line noted not present at time of fixation     Assessment:   45y.o. year old male with left acetabulum fracture status post ORIF with new fracture not present at time of fixation  Plan:   Reviewed the findings with the patient and his son. Recommended obtaining a CT scan to better assess the new areas of injury and displacement. Stressed the importance of compliance with toe-touch weightbearing restrictions not only from a pain control standpoint but also to allow proper healing. We will order a CT scan stat and will follow up with the patient and soon as the results are available to discuss conservative versus operative management of these new findings. No orders of the defined types were placed in this encounter.          Orders Placed This Encounter   Procedures    XR PELVIS (MIN 3 VIEWS)     Standing Status:   Future     Number of Occurrences:   1     Standing Expiration Date:   1/13/2024    CT PELVIS WO CONTRAST Additional Contrast? None     Metal suppression, Thin cuts 2 mm of less     Standing Status:   Future     Number of Occurrences:   1     Standing Expiration Date:   1/17/2024     Order Specific Question:   Additional Contrast?     Answer:   None     Order Specific Question:   Reason for exam:     Answer:   New fracture, Implant failure, eval for potential revision surgery       Electronically signed by Canelo Murry DO on 2/19/2023 at 5:55 PM    This note is created with the assistance of a speech recognition program.  While intending to generate a document that actually reflects the content of the visit, the document can still have some errors including those of syntax and sound a like substitutions which may escape proof reading.  In such instances, actual meaning can be extrapolated by contextual diversion

## 2023-02-22 DIAGNOSIS — S32.402D CLOSED DISPLACED FRACTURE OF LEFT ACETABULUM WITH ROUTINE HEALING, UNSPECIFIED PORTION OF ACETABULUM, SUBSEQUENT ENCOUNTER: ICD-10-CM

## 2023-02-22 RX ORDER — GABAPENTIN 100 MG/1
100 CAPSULE ORAL 3 TIMES DAILY
Qty: 42 CAPSULE | Refills: 0 | Status: SHIPPED | OUTPATIENT
Start: 2023-02-22 | End: 2023-03-08

## 2023-02-22 RX ORDER — CYCLOBENZAPRINE HCL 10 MG
10 TABLET ORAL 3 TIMES DAILY PRN
Qty: 30 TABLET | Refills: 0 | Status: SHIPPED | OUTPATIENT
Start: 2023-02-22

## 2023-02-22 NOTE — TELEPHONE ENCOUNTER
[facility] pharmacy called in requesting pts refills for gabapentin and flexeril be sent in, noted the percocet would likely have to stay at his local pharmacy.

## 2023-02-23 DIAGNOSIS — S32.402D CLOSED DISPLACED FRACTURE OF LEFT ACETABULUM WITH ROUTINE HEALING, UNSPECIFIED PORTION OF ACETABULUM, SUBSEQUENT ENCOUNTER: ICD-10-CM

## 2023-02-23 RX ORDER — CYCLOBENZAPRINE HCL 10 MG
TABLET ORAL
Qty: 30 TABLET | Refills: 10 | OUTPATIENT
Start: 2023-02-23

## 2023-02-23 RX ORDER — GABAPENTIN 100 MG/1
CAPSULE ORAL
Qty: 42 CAPSULE | Refills: 10 | OUTPATIENT
Start: 2023-02-23

## 2023-02-24 ENCOUNTER — TELEPHONE (OUTPATIENT)
Dept: ORTHOPEDIC SURGERY | Age: 53
End: 2023-02-24

## 2023-03-07 ENCOUNTER — OFFICE VISIT (OUTPATIENT)
Dept: ORTHOPEDIC SURGERY | Age: 53
End: 2023-03-07

## 2023-03-07 VITALS — WEIGHT: 286 LBS | BODY MASS INDEX: 39.89 KG/M2

## 2023-03-07 DIAGNOSIS — S32.402D CLOSED DISPLACED FRACTURE OF LEFT ACETABULUM WITH ROUTINE HEALING, UNSPECIFIED PORTION OF ACETABULUM, SUBSEQUENT ENCOUNTER: Primary | ICD-10-CM

## 2023-03-07 RX ORDER — CYCLOBENZAPRINE HCL 10 MG
10 TABLET ORAL 3 TIMES DAILY PRN
Qty: 60 TABLET | Refills: 0 | Status: SHIPPED | OUTPATIENT
Start: 2023-03-07

## 2023-03-07 RX ORDER — HYDROCODONE BITARTRATE AND ACETAMINOPHEN 5; 325 MG/1; MG/1
1 TABLET ORAL EVERY 6 HOURS PRN
Qty: 28 TABLET | Refills: 0 | Status: SHIPPED | OUTPATIENT
Start: 2023-03-07 | End: 2023-03-14

## 2023-03-07 NOTE — PROGRESS NOTES
[facility]  [address]  [facility] [address]  Dept Phone: [phone]  Dept Fax: [phone]      Orthopaedic Trauma Clinic Follow Up      Subjective:   Date of Surgery: 11/9/2022    Beck Ochoa is a 52 y.o. year old male who presents to the clinic today for routine follow up 4 months status post ORIF of his left acetabulum fracture. Patient also has a left iliac wing fracture that was subsequently identified on x-ray at his last office visit. Patient was instructed to be toe touch weight bearing on the left lower extremity to allow for the fracture to properly heal. Patient presents today with his mother. Patient states he has slowed down his activity level but continues to weight bear about 50-70% of his weight on the left lower extremity with the use of a walker, despite his known restrictions. States since his last visit, his pain has improved but now his pain is more along the top of the proximal thigh, describing it as \"it feels bruised\". Denies any pain in the hip joint. He continues to have numbness throughout the left thigh. States he now feels just generally weak and deconditioned. He denies any new injuries or falls. Denies any other numbness or tingling.     Review of Systems  Gen: no fever, chills, malaise  CV: no chest pain or palpitations  Resp: no cough or shortness of breath  GI: no nausea, vomiting, diarrhea, or constipation  Neuro: no seizures, vertigo, or headache  Msk: left thigh pain   10 remaining systems reviewed and negative    Objective :   There were no vitals filed for this visit.Body mass index is 39.89 kg/m².  General: No acute distress, resting comfortably in the clinic  Neuro: alert. oriented  Eyes: Extra-ocular muscles intact  Pulm: Respirations unlabored and regular.  Skin: warm, well perfused  Psych:   Patient has good fund of knowledge and displays understanding of exam, diagnosis, and plan.  LLE:  Skin intact. Surgical incisions well  approximated and healing without evidence of infection. No pain with anterior/posterior and lateral compression of the pelvis. No TTP about the incision site or iliac crest. Mild TTP about the proximal thigh soft tissue. No pain with passive circumduction motion of the hip. Minimal active ROM of the hip due to weakness. Able to straight leg raise. Compartments soft. 2+ DP pulse. TA/EHL/FHL/GS motor intact. Deep and Superficial Peroneal/Saphenous/Sural SILT. Radiology:  Imaging studies from today were independently reviewed and read as listed below. Any relevant images obtained prior to today's visit were also independently interpreted. History: Left acetabular fracture status post ORIF on 11/9/2022    Comparison: 1/17/2023    Findings: 3 views of the pelvis (AP, OO Judet, IO Judet ) in a skeletally mature patient showing internal fixation of a comminuted acetabulum fracture with multiple plates and screws. The screw in the posterior column that previously backed out remains unchanged in alignment. Re-demonstration of iliac wing fracture with increased bony callus formation compared to previous imaging. No new fractures or dislocations. No acute orthopedic hardware complications or loss of fixation. Some heterotopic ossification formation along the posterior wall with no significant change compared to previous imaging. Impression: Stable ORIF left acetabular fracture with re-demonstration of left iliac wing fracture with interval healing. Assessment:   45y.o. year old male with ORIF left acetabulum fracture; DOS: 11/9/2022. Plan:   - Reviewed x-rays with the patient which revealed increased bony callus formation and no new hardware complications. - At this time, patient can weight bear as tolerated to the left lower extremity with no formal restrictions. Recommend PT to work on overall hip range of motion and strengthening.  External PT order provided and informed patient that he would have to call to set up an appointment at any PT facility of his choice. - Refills on pain medications sent to pharmacy on file per patient request. Gonzales Kiran to continue weaning off of narcotics. - Plan to follow up in 2 months with repeat x-rays. Follow up:Return in about 2 months (around 5/7/2023) for x-rays. Orders Placed This Encounter   Medications    HYDROcodone-acetaminophen (NORCO) 5-325 MG per tablet     Sig: Take 1 tablet by mouth every 6 hours as needed for Pain for up to 7 days. Intended supply: 7 days. Take lowest dose possible to manage pain Max Daily Amount: 4 tablets     Dispense:  28 tablet     Refill:  0     Reduce doses taken as pain becomes manageable    cyclobenzaprine (FLEXERIL) 10 MG tablet     Sig: Take 1 tablet by mouth 3 times daily as needed for Muscle spasms     Dispense:  60 tablet     Refill:  0          Orders Placed This Encounter   Procedures    XR PELVIS (MIN 3 VIEWS)     Standing Status:   Future     Number of Occurrences:   1     Standing Expiration Date:   3/3/2024    External Referral To Physical Therapy     Referral Priority:   Routine     Referral Type:   Eval and Treat     Referral Reason:   Specialty Services Required     Requested Specialty:   Physical Therapist     Number of Visits Requested:   1       Electronically signed by BILL Griffin CNP on 3/7/2023 at 2:33 PM    This note is created with the assistance of a speech recognition program.  While intending to generate a document that actually reflects the content of the visit, the document can still have some errors including those of syntax and sound a like substitutions which may escape proof reading.   In such instances, actual meaning can be extrapolated by contextual diversion

## 2023-05-05 ENCOUNTER — TELEPHONE (OUTPATIENT)
Dept: ORTHOPEDIC SURGERY | Age: 53
End: 2023-05-05

## 2023-05-09 ENCOUNTER — OFFICE VISIT (OUTPATIENT)
Dept: ORTHOPEDIC SURGERY | Age: 53
End: 2023-05-09

## 2023-05-09 VITALS — HEIGHT: 71 IN | WEIGHT: 264 LBS | BODY MASS INDEX: 36.96 KG/M2

## 2023-05-09 DIAGNOSIS — S32.402D CLOSED DISPLACED FRACTURE OF LEFT ACETABULUM WITH ROUTINE HEALING, UNSPECIFIED PORTION OF ACETABULUM, SUBSEQUENT ENCOUNTER: Primary | ICD-10-CM

## 2023-05-09 ASSESSMENT — ENCOUNTER SYMPTOMS
GASTROINTESTINAL NEGATIVE: 1
RESPIRATORY NEGATIVE: 1
EYES NEGATIVE: 1
ALLERGIC/IMMUNOLOGIC NEGATIVE: 1

## 2023-05-09 NOTE — PROGRESS NOTES
fracture; DOS: 11/9/2022. Plan:   - Reviewed x-rays with the patient which revealed increased bony callus formation and no new hardware complications. - At this time, patient can weight bear as tolerated to the left lower extremity with no formal restrictions. Recommend continue PT to work on overall hip range of motion and strengthening.   -Patient will follow-up with referral to orthopedic surgeon in [place] as his insurance will not be covered to come back to [place]. - Plan to follow up as needed    Follow up:No follow-ups on file. No orders of the defined types were placed in this encounter. Orders Placed This Encounter   Procedures    XR PELVIS (MIN 3 VIEWS)     Standing Status:   Future     Number of Occurrences:   1     Standing Expiration Date:   5/4/2024       Electronically signed by Amada Draper DPM on 5/9/2023 at 4:56 PM    This note is created with the assistance of a speech recognition program.  While intending to generate a document that actually reflects the content of the visit, the document can still have some errors including those of syntax and sound a like substitutions which may escape proof reading.   In such instances, actual meaning can be extrapolated by contextual diversion

## (undated) DEVICE — 1010 S-DRAPE TOWEL DRAPE 10/BX: Brand: STERI-DRAPE™

## (undated) DEVICE — COUNTER NDL 40 COUNT HLD 70 FOAM BLK ADH W/ MAG

## (undated) DEVICE — 3M™ STERI-STRIP™ COMPOUND BENZOIN TINCTURE 40 BAGS/CARTON 4 CARTONS/CASE C1544: Brand: 3M™ STERI-STRIP™

## (undated) DEVICE — DRAIN SURG 15FR SIL SMOOTH RND 1/8IN END PERF W/ TRCR RADPQ

## (undated) DEVICE — NEPTUNE E-SEP 165MM SUCTION SLEEVE: Brand: NEPTUNE E-SEP

## (undated) DEVICE — 1016 S-DRAPE IRRIG POUCH 10/BOX: Brand: STERI-DRAPE™

## (undated) DEVICE — 450 ML BOTTLE OF 0.05% CHLORHEXIDINE GLUCONATE IN 99.95% STERILE WATER FOR IRRIGATION, USP AND APPLICATOR.: Brand: IRRISEPT ANTIMICROBIAL WOUND LAVAGE

## (undated) DEVICE — APPLICATOR MEDICATED 26 CC SOLUTION HI LT ORNG CHLORAPREP

## (undated) DEVICE — Device

## (undated) DEVICE — GLOVE ORANGE PI 7 1/2   MSG9075

## (undated) DEVICE — SCREW FIX L200MM DIA5MM THRD L80MM S STL SELF DRL SCHNZ

## (undated) DEVICE — DRAPE,REIN 53X77,STERILE: Brand: MEDLINE

## (undated) DEVICE — SCALED DRILL , AO FITTING: Brand: PRO

## (undated) DEVICE — ELECTRODE PT RET AD L9FT HI MOIST COND ADH HYDRGEL CORDED

## (undated) DEVICE — BANDAGE COBAN 6 IN WND 6INX5YD FOAM

## (undated) DEVICE — APPLIER CLP L L13IN TI MULT RNG HNDL 20 CLP STR LIGACLP

## (undated) DEVICE — OVERDRILL FOR 3.5MM SCREWS; AO: Brand: PRO

## (undated) DEVICE — CLEANER,CAUTERY TIP,2X2",STERILE: Brand: MEDLINE

## (undated) DEVICE — YANKAUER,FLEXIBLE HANDLE,REGLR CAPACITY: Brand: MEDLINE INDUSTRIES, INC.

## (undated) DEVICE — GOWN,SIRUS,NONRNF,SETINSLV,XL,20/CS: Brand: MEDLINE

## (undated) DEVICE — GLOVE ORANGE PI 7   MSG9070

## (undated) DEVICE — OPTIFOAM GENTLE AG,POST-OP STRIP,3.5X14: Brand: MEDLINE

## (undated) DEVICE — GAUZE,SPONGE,FLUFF,6"X6.75",STRL,5/TRAY: Brand: MEDLINE

## (undated) DEVICE — SOLUTION PREP POVIDONE IOD FOR SKIN MUCOUS MEM PRIOR TO

## (undated) DEVICE — PROTECTOR ULN NRV PUR FOAM HK LOOP STRP ANATOMICALLY

## (undated) DEVICE — TOWEL,OR,DSP,ST,NATURAL,DLX,4/PK,20PK/CS: Brand: MEDLINE

## (undated) DEVICE — STOCKINETTE,IMPERVIOUS,12X48,STERILE: Brand: MEDLINE

## (undated) DEVICE — CLAMP EXT FIX CLP ON SELF HLD MAG RESONANCE CONDITIONAL

## (undated) DEVICE — RETRACTOR ORTH W3.5MM LT SOURC CBL FBR OPT ADPT LT PIPE

## (undated) DEVICE — THE STERILE LIGHT HANDLE COVER IS USED WITH STERIS SURGICAL LIGHTING AND VISUALIZATION SYSTEMS.

## (undated) DEVICE — DRILL BIT AO FITTING

## (undated) DEVICE — ADHESIVE SKIN CLOSURE TOP 36 CC HI VISC DERMBND MINI

## (undated) DEVICE — DRESSING FOAM W4XL4IN AG SIL FACE BORD IONIC ANTIMIC ADH

## (undated) DEVICE — INTENDED FOR TISSUE SEPARATION, AND OTHER PROCEDURES THAT REQUIRE A SHARP SURGICAL BLADE TO PUNCTURE OR CUT.: Brand: BARD-PARKER ® CARBON RIB-BACK BLADES

## (undated) DEVICE — MARKER SURG SKIN UTIL BLK REG TIP NONSMEARING W/ 6IN RUL

## (undated) DEVICE — SVMMC ORTH SPL DRP PK

## (undated) DEVICE — KIT EVAC 0.13IN RECT TB DIA10FR 400CC PVC 3 SPR Y CONN DRN

## (undated) DEVICE — CORTEX SCREW S.T.
Type: IMPLANTABLE DEVICE | Site: ACETABULUM | Status: NON-FUNCTIONAL
Removed: 2022-11-09

## (undated) DEVICE — SHEET, T, LAPAROTOMY, STERILE: Brand: MEDLINE

## (undated) DEVICE — CYSTO/BLADDER IRRIGATION SET, REGULATING CLAMP

## (undated) DEVICE — APPLIER LIG CLP M L11IN TI STR RNG HNDL FOR 20 CLP DISP

## (undated) DEVICE — DRESSING FOAM W4XL10IN AG SIL ADH ANTIMIC POSTOP OPTIFOAM

## (undated) DEVICE — GLOVE ORANGE PI 8   MSG9080

## (undated) DEVICE — BLADE ES L6IN ELASTOMERIC COAT EXT DURABLE BEND UPTO 90DEG

## (undated) DEVICE — CONTAINER,SPECIMEN,4OZ,OR STRL: Brand: MEDLINE

## (undated) DEVICE — SYRINGE IRRIG 60ML SFT PLIABLE BLB EZ TO GRP 1 HND USE W/

## (undated) DEVICE — WAX SURG 2.5GM HEMSTAT BNE BEESWAX PARAFFIN ISO PALMITATE

## (undated) DEVICE — DRAPE,U/ SHT,SPLIT,PLAS,STERIL: Brand: MEDLINE

## (undated) DEVICE — SUTURE VCRL SZ 0 L18IN ABSRB UD L36MM CT-1 1/2 CIR J840D

## (undated) DEVICE — 3M™ IOBAN™ 2 ANTIMICROBIAL INCISE DRAPE 6651EZ: Brand: IOBAN™ 2

## (undated) DEVICE — ROD EXT FIX L250MM DIA11MM C FBR MR CONDITIONAL

## (undated) DEVICE — BLADE CLIPPER GEN PURP NS

## (undated) DEVICE — INTENDED TO SUPPORT AND MAINTAIN THE POSITION OF AN ANESTHETIZED PATIENT DURING SURGERY: Brand: MD TRACTION ROPE

## (undated) DEVICE — CLAMP EXT FIX L TI ALLY COMB CLP ON SELF HLD

## (undated) DEVICE — HANDPIECE SET WITH COAXIAL HIGH FLOW TIP AND SUCTION TUBE: Brand: INTERPULSE

## (undated) DEVICE — SUTURE VCRL SZ 2-0 L18IN ABSRB UD CT-1 L36MM 1/2 CIR J839D

## (undated) DEVICE — SPONGE LAP W18XL18IN WHT COT 4 PLY FLD STRUNG RADPQ DISP ST